# Patient Record
Sex: MALE | Race: WHITE | NOT HISPANIC OR LATINO | URBAN - METROPOLITAN AREA
[De-identification: names, ages, dates, MRNs, and addresses within clinical notes are randomized per-mention and may not be internally consistent; named-entity substitution may affect disease eponyms.]

---

## 2023-01-01 ENCOUNTER — HOSPITAL ENCOUNTER (OUTPATIENT)
Dept: RADIOLOGY | Facility: MEDICAL CENTER | Age: 78
End: 2023-10-29
Attending: INTERNAL MEDICINE

## 2023-01-01 ENCOUNTER — APPOINTMENT (OUTPATIENT)
Dept: RADIOLOGY | Facility: MEDICAL CENTER | Age: 78
DRG: 037 | End: 2023-01-01
Attending: EMERGENCY MEDICINE
Payer: MEDICARE

## 2023-01-01 ENCOUNTER — APPOINTMENT (OUTPATIENT)
Dept: RADIOLOGY | Facility: MEDICAL CENTER | Age: 78
DRG: 037 | End: 2023-01-01
Attending: INTERNAL MEDICINE
Payer: MEDICARE

## 2023-01-01 ENCOUNTER — APPOINTMENT (OUTPATIENT)
Dept: RADIOLOGY | Facility: MEDICAL CENTER | Age: 78
DRG: 037 | End: 2023-01-01
Attending: NURSE PRACTITIONER
Payer: MEDICARE

## 2023-01-01 ENCOUNTER — APPOINTMENT (OUTPATIENT)
Dept: RADIOLOGY | Facility: MEDICAL CENTER | Age: 78
DRG: 037 | End: 2023-01-01
Attending: PSYCHIATRY & NEUROLOGY
Payer: MEDICARE

## 2023-01-01 ENCOUNTER — APPOINTMENT (OUTPATIENT)
Dept: CARDIOLOGY | Facility: MEDICAL CENTER | Age: 78
DRG: 037 | End: 2023-01-01
Attending: INTERNAL MEDICINE
Payer: MEDICARE

## 2023-01-01 ENCOUNTER — HOSPITAL ENCOUNTER (INPATIENT)
Facility: MEDICAL CENTER | Age: 78
LOS: 5 days | DRG: 037 | End: 2023-11-03
Attending: EMERGENCY MEDICINE | Admitting: INTERNAL MEDICINE
Payer: MEDICARE

## 2023-01-01 VITALS
WEIGHT: 194.67 LBS | HEIGHT: 73 IN | RESPIRATION RATE: 24 BRPM | OXYGEN SATURATION: 65 % | DIASTOLIC BLOOD PRESSURE: 78 MMHG | BODY MASS INDEX: 25.8 KG/M2 | HEART RATE: 83 BPM | TEMPERATURE: 97 F | SYSTOLIC BLOOD PRESSURE: 134 MMHG

## 2023-01-01 DIAGNOSIS — I63.512 ACUTE ISCHEMIC LEFT MIDDLE CEREBRAL ARTERY (MCA) STROKE (HCC): ICD-10-CM

## 2023-01-01 DIAGNOSIS — J96.01 ACUTE RESPIRATORY FAILURE WITH HYPOXIA (HCC): ICD-10-CM

## 2023-01-01 DIAGNOSIS — C79.9 METASTATIC ADENOCARCINOMA (HCC): ICD-10-CM

## 2023-01-01 LAB
ABO + RH BLD: NORMAL
ABO GROUP BLD: NORMAL
ALBUMIN SERPL BCP-MCNC: 2.8 G/DL (ref 3.2–4.9)
ALBUMIN SERPL BCP-MCNC: 3.2 G/DL (ref 3.2–4.9)
ALBUMIN SERPL BCP-MCNC: 3.5 G/DL (ref 3.2–4.9)
ALBUMIN SERPL BCP-MCNC: 4 G/DL (ref 3.2–4.9)
ALBUMIN/GLOB SERPL: 1.1 G/DL
ALBUMIN/GLOB SERPL: 1.2 G/DL
ALBUMIN/GLOB SERPL: 1.3 G/DL
ALBUMIN/GLOB SERPL: 1.3 G/DL
ALP SERPL-CCNC: 101 U/L (ref 30–99)
ALP SERPL-CCNC: 103 U/L (ref 30–99)
ALP SERPL-CCNC: 76 U/L (ref 30–99)
ALP SERPL-CCNC: 86 U/L (ref 30–99)
ALT SERPL-CCNC: 21 U/L (ref 2–50)
ALT SERPL-CCNC: 27 U/L (ref 2–50)
ALT SERPL-CCNC: 31 U/L (ref 2–50)
ALT SERPL-CCNC: 43 U/L (ref 2–50)
ANION GAP SERPL CALC-SCNC: 10 MMOL/L (ref 7–16)
ANION GAP SERPL CALC-SCNC: 12 MMOL/L (ref 7–16)
ANION GAP SERPL CALC-SCNC: 12 MMOL/L (ref 7–16)
ANION GAP SERPL CALC-SCNC: 9 MMOL/L (ref 7–16)
APTT PPP: 35.2 SEC (ref 24.7–36)
ARTERIAL PATENCY WRIST A: ABNORMAL
AST SERPL-CCNC: 24 U/L (ref 12–45)
AST SERPL-CCNC: 34 U/L (ref 12–45)
AST SERPL-CCNC: 44 U/L (ref 12–45)
AST SERPL-CCNC: 47 U/L (ref 12–45)
BASE EXCESS BLDA CALC-SCNC: -2 MMOL/L (ref -4–3)
BASE EXCESS BLDA CALC-SCNC: -3 MMOL/L (ref -4–3)
BASE EXCESS BLDA CALC-SCNC: 1 MMOL/L (ref -4–3)
BASOPHILS # BLD AUTO: 0.3 % (ref 0–1.8)
BASOPHILS # BLD AUTO: 0.4 % (ref 0–1.8)
BASOPHILS # BLD AUTO: 0.5 % (ref 0–1.8)
BASOPHILS # BLD AUTO: 0.6 % (ref 0–1.8)
BASOPHILS # BLD: 0.04 K/UL (ref 0–0.12)
BASOPHILS # BLD: 0.04 K/UL (ref 0–0.12)
BASOPHILS # BLD: 0.06 K/UL (ref 0–0.12)
BASOPHILS # BLD: 0.07 K/UL (ref 0–0.12)
BILIRUB SERPL-MCNC: 0.4 MG/DL (ref 0.1–1.5)
BILIRUB SERPL-MCNC: 0.5 MG/DL (ref 0.1–1.5)
BLD GP AB SCN SERPL QL: NORMAL
BODY TEMPERATURE: ABNORMAL DEGREES
BREATHS SETTING VENT: 18
BREATHS SETTING VENT: 22
BREATHS SETTING VENT: 26
BUN SERPL-MCNC: 11 MG/DL (ref 8–22)
BUN SERPL-MCNC: 13 MG/DL (ref 8–22)
BUN SERPL-MCNC: 17 MG/DL (ref 8–22)
BUN SERPL-MCNC: 19 MG/DL (ref 8–22)
CALCIUM ALBUM COR SERPL-MCNC: 10 MG/DL (ref 8.5–10.5)
CALCIUM ALBUM COR SERPL-MCNC: 9 MG/DL (ref 8.5–10.5)
CALCIUM ALBUM COR SERPL-MCNC: 9.3 MG/DL (ref 8.5–10.5)
CALCIUM ALBUM COR SERPL-MCNC: 9.4 MG/DL (ref 8.5–10.5)
CALCIUM SERPL-MCNC: 10 MG/DL (ref 8.5–10.5)
CALCIUM SERPL-MCNC: 8 MG/DL (ref 8.5–10.5)
CALCIUM SERPL-MCNC: 8.8 MG/DL (ref 8.5–10.5)
CALCIUM SERPL-MCNC: 8.9 MG/DL (ref 8.5–10.5)
CHLORIDE SERPL-SCNC: 101 MMOL/L (ref 96–112)
CHLORIDE SERPL-SCNC: 102 MMOL/L (ref 96–112)
CHLORIDE SERPL-SCNC: 102 MMOL/L (ref 96–112)
CHLORIDE SERPL-SCNC: 103 MMOL/L (ref 96–112)
CHOLEST SERPL-MCNC: 105 MG/DL (ref 100–199)
CHOLEST SERPL-MCNC: 129 MG/DL (ref 100–199)
CO2 BLDA-SCNC: 21 MMOL/L (ref 20–33)
CO2 BLDA-SCNC: 21 MMOL/L (ref 20–33)
CO2 BLDA-SCNC: 25 MMOL/L (ref 20–33)
CO2 SERPL-SCNC: 20 MMOL/L (ref 20–33)
CO2 SERPL-SCNC: 23 MMOL/L (ref 20–33)
CREAT SERPL-MCNC: 0.53 MG/DL (ref 0.5–1.4)
CREAT SERPL-MCNC: 0.54 MG/DL (ref 0.5–1.4)
CREAT SERPL-MCNC: 0.74 MG/DL (ref 0.5–1.4)
CREAT SERPL-MCNC: 0.8 MG/DL (ref 0.5–1.4)
CRP SERPL HS-MCNC: 1.86 MG/DL (ref 0–0.75)
DELSYS IDSYS: ABNORMAL
EKG IMPRESSION: NORMAL
END TIDAL CARBON DIOXIDE IECO2: 21 MMHG
END TIDAL CARBON DIOXIDE IECO2: 23 MMHG
END TIDAL CARBON DIOXIDE IECO2: 28 MMHG
EOSINOPHIL # BLD AUTO: 0.01 K/UL (ref 0–0.51)
EOSINOPHIL # BLD AUTO: 0.09 K/UL (ref 0–0.51)
EOSINOPHIL # BLD AUTO: 0.12 K/UL (ref 0–0.51)
EOSINOPHIL # BLD AUTO: 0.16 K/UL (ref 0–0.51)
EOSINOPHIL NFR BLD: 0.1 % (ref 0–6.9)
EOSINOPHIL NFR BLD: 0.9 % (ref 0–6.9)
EOSINOPHIL NFR BLD: 1.1 % (ref 0–6.9)
EOSINOPHIL NFR BLD: 1.4 % (ref 0–6.9)
ERYTHROCYTE [DISTWIDTH] IN BLOOD BY AUTOMATED COUNT: 46.7 FL (ref 35.9–50)
ERYTHROCYTE [DISTWIDTH] IN BLOOD BY AUTOMATED COUNT: 46.8 FL (ref 35.9–50)
ERYTHROCYTE [DISTWIDTH] IN BLOOD BY AUTOMATED COUNT: 47.2 FL (ref 35.9–50)
ERYTHROCYTE [DISTWIDTH] IN BLOOD BY AUTOMATED COUNT: 48.3 FL (ref 35.9–50)
EST. AVERAGE GLUCOSE BLD GHB EST-MCNC: 131 MG/DL
GFR SERPLBLD CREATININE-BSD FMLA CKD-EPI: 102 ML/MIN/1.73 M 2
GFR SERPLBLD CREATININE-BSD FMLA CKD-EPI: 103 ML/MIN/1.73 M 2
GFR SERPLBLD CREATININE-BSD FMLA CKD-EPI: 91 ML/MIN/1.73 M 2
GFR SERPLBLD CREATININE-BSD FMLA CKD-EPI: 93 ML/MIN/1.73 M 2
GLOBULIN SER CALC-MCNC: 2.5 G/DL (ref 1.9–3.5)
GLOBULIN SER CALC-MCNC: 2.7 G/DL (ref 1.9–3.5)
GLOBULIN SER CALC-MCNC: 2.7 G/DL (ref 1.9–3.5)
GLOBULIN SER CALC-MCNC: 3 G/DL (ref 1.9–3.5)
GLUCOSE BLD STRIP.AUTO-MCNC: 109 MG/DL (ref 65–99)
GLUCOSE BLD STRIP.AUTO-MCNC: 123 MG/DL (ref 65–99)
GLUCOSE BLD STRIP.AUTO-MCNC: 130 MG/DL (ref 65–99)
GLUCOSE BLD STRIP.AUTO-MCNC: 130 MG/DL (ref 65–99)
GLUCOSE BLD STRIP.AUTO-MCNC: 131 MG/DL (ref 65–99)
GLUCOSE BLD STRIP.AUTO-MCNC: 135 MG/DL (ref 65–99)
GLUCOSE BLD STRIP.AUTO-MCNC: 148 MG/DL (ref 65–99)
GLUCOSE SERPL-MCNC: 116 MG/DL (ref 65–99)
GLUCOSE SERPL-MCNC: 133 MG/DL (ref 65–99)
GLUCOSE SERPL-MCNC: 143 MG/DL (ref 65–99)
GLUCOSE SERPL-MCNC: 154 MG/DL (ref 65–99)
HBA1C MFR BLD: 6.2 % (ref 4–5.6)
HCO3 BLDA-SCNC: 20.2 MMOL/L (ref 17–25)
HCO3 BLDA-SCNC: 20.2 MMOL/L (ref 17–25)
HCO3 BLDA-SCNC: 24.3 MMOL/L (ref 17–25)
HCT VFR BLD AUTO: 35.9 % (ref 42–52)
HCT VFR BLD AUTO: 36.9 % (ref 42–52)
HCT VFR BLD AUTO: 41.3 % (ref 42–52)
HCT VFR BLD AUTO: 43.7 % (ref 42–52)
HDLC SERPL-MCNC: 41 MG/DL
HDLC SERPL-MCNC: 46 MG/DL
HGB BLD-MCNC: 11.9 G/DL (ref 14–18)
HGB BLD-MCNC: 12.3 G/DL (ref 14–18)
HGB BLD-MCNC: 13.5 G/DL (ref 14–18)
HGB BLD-MCNC: 15 G/DL (ref 14–18)
HOROWITZ INDEX BLDA+IHG-RTO: 1163 MM[HG]
HOROWITZ INDEX BLDA+IHG-RTO: 218 MM[HG]
HOROWITZ INDEX BLDA+IHG-RTO: 293 MM[HG]
IMM GRANULOCYTES # BLD AUTO: 0.05 K/UL (ref 0–0.11)
IMM GRANULOCYTES # BLD AUTO: 0.06 K/UL (ref 0–0.11)
IMM GRANULOCYTES NFR BLD AUTO: 0.4 % (ref 0–0.9)
IMM GRANULOCYTES NFR BLD AUTO: 0.4 % (ref 0–0.9)
IMM GRANULOCYTES NFR BLD AUTO: 0.5 % (ref 0–0.9)
IMM GRANULOCYTES NFR BLD AUTO: 0.5 % (ref 0–0.9)
INR PPP: 1.21 (ref 0.87–1.13)
LDLC SERPL CALC-MCNC: 47 MG/DL
LDLC SERPL CALC-MCNC: 64 MG/DL
LV EJECT FRACT  99904: 60
LV EJECT FRACT MOD 2C 99903: 61.32
LV EJECT FRACT MOD 4C 99902: 52.06
LV EJECT FRACT MOD BP 99901: 58.23
LYMPHOCYTES # BLD AUTO: 0.9 K/UL (ref 1–4.8)
LYMPHOCYTES # BLD AUTO: 0.9 K/UL (ref 1–4.8)
LYMPHOCYTES # BLD AUTO: 0.99 K/UL (ref 1–4.8)
LYMPHOCYTES # BLD AUTO: 1.12 K/UL (ref 1–4.8)
LYMPHOCYTES NFR BLD: 10.9 % (ref 22–41)
LYMPHOCYTES NFR BLD: 7.6 % (ref 22–41)
LYMPHOCYTES NFR BLD: 8.1 % (ref 22–41)
LYMPHOCYTES NFR BLD: 8.8 % (ref 22–41)
MAGNESIUM SERPL-MCNC: 1.9 MG/DL (ref 1.5–2.5)
MAGNESIUM SERPL-MCNC: 2 MG/DL (ref 1.5–2.5)
MAGNESIUM SERPL-MCNC: 2.3 MG/DL (ref 1.5–2.5)
MCH RBC QN AUTO: 30.5 PG (ref 27–33)
MCH RBC QN AUTO: 30.6 PG (ref 27–33)
MCH RBC QN AUTO: 31 PG (ref 27–33)
MCH RBC QN AUTO: 31.6 PG (ref 27–33)
MCHC RBC AUTO-ENTMCNC: 32.7 G/DL (ref 32.3–36.5)
MCHC RBC AUTO-ENTMCNC: 33.1 G/DL (ref 32.3–36.5)
MCHC RBC AUTO-ENTMCNC: 33.3 G/DL (ref 32.3–36.5)
MCHC RBC AUTO-ENTMCNC: 34.3 G/DL (ref 32.3–36.5)
MCV RBC AUTO: 91.8 FL (ref 81.4–97.8)
MCV RBC AUTO: 92 FL (ref 81.4–97.8)
MCV RBC AUTO: 93.4 FL (ref 81.4–97.8)
MCV RBC AUTO: 93.5 FL (ref 81.4–97.8)
MODE IMODE: ABNORMAL
MONOCYTES # BLD AUTO: 0.98 K/UL (ref 0–0.85)
MONOCYTES # BLD AUTO: 1.01 K/UL (ref 0–0.85)
MONOCYTES # BLD AUTO: 1.03 K/UL (ref 0–0.85)
MONOCYTES # BLD AUTO: 1.07 K/UL (ref 0–0.85)
MONOCYTES NFR BLD AUTO: 8.8 % (ref 0–13.4)
MONOCYTES NFR BLD AUTO: 9.1 % (ref 0–13.4)
MONOCYTES NFR BLD AUTO: 9.1 % (ref 0–13.4)
MONOCYTES NFR BLD AUTO: 9.9 % (ref 0–13.4)
NEUTROPHILS # BLD AUTO: 7.92 K/UL (ref 1.82–7.42)
NEUTROPHILS # BLD AUTO: 9.01 K/UL (ref 1.82–7.42)
NEUTROPHILS # BLD AUTO: 9.04 K/UL (ref 1.82–7.42)
NEUTROPHILS # BLD AUTO: 9.7 K/UL (ref 1.82–7.42)
NEUTROPHILS NFR BLD: 77.4 % (ref 44–72)
NEUTROPHILS NFR BLD: 80 % (ref 44–72)
NEUTROPHILS NFR BLD: 80.7 % (ref 44–72)
NEUTROPHILS NFR BLD: 82.5 % (ref 44–72)
NRBC # BLD AUTO: 0 K/UL
NRBC BLD-RTO: 0 /100 WBC (ref 0–0.2)
O2/TOTAL GAS SETTING VFR VENT: 30 %
O2/TOTAL GAS SETTING VFR VENT: 30 %
O2/TOTAL GAS SETTING VFR VENT: 40 %
PCO2 BLDA: 28.9 MMHG (ref 26–37)
PCO2 BLDA: 30.5 MMHG (ref 26–37)
PCO2 BLDA: 32.8 MMHG (ref 26–37)
PCO2 TEMP ADJ BLDA: 29.9 MMHG (ref 26–37)
PCO2 TEMP ADJ BLDA: 31.5 MMHG (ref 26–37)
PCO2 TEMP ADJ BLDA: 33.9 MMHG (ref 26–37)
PEEP END EXPIRATORY PRESSURE IPEEP: 8 CMH20
PH BLDA: 7.43 [PH] (ref 7.4–7.5)
PH BLDA: 7.45 [PH] (ref 7.4–7.5)
PH BLDA: 7.48 [PH] (ref 7.4–7.5)
PH TEMP ADJ BLDA: 7.42 [PH] (ref 7.4–7.5)
PH TEMP ADJ BLDA: 7.44 [PH] (ref 7.4–7.5)
PH TEMP ADJ BLDA: 7.47 [PH] (ref 7.4–7.5)
PHOSPHATE SERPL-MCNC: 1.5 MG/DL (ref 2.5–4.5)
PHOSPHATE SERPL-MCNC: 2.5 MG/DL (ref 2.5–4.5)
PHOSPHATE SERPL-MCNC: 2.6 MG/DL (ref 2.5–4.5)
PHOSPHATE SERPL-MCNC: 3 MG/DL (ref 2.5–4.5)
PLATELET # BLD AUTO: 103 K/UL (ref 164–446)
PLATELET # BLD AUTO: 123 K/UL (ref 164–446)
PLATELET # BLD AUTO: 141 K/UL (ref 164–446)
PLATELET # BLD AUTO: 82 K/UL (ref 164–446)
PLATELETS.RETICULATED NFR BLD AUTO: 4.1 % (ref 0.6–13.1)
PMV BLD AUTO: 10.2 FL (ref 9–12.9)
PMV BLD AUTO: 10.4 FL (ref 9–12.9)
PMV BLD AUTO: 10.9 FL (ref 9–12.9)
PMV BLD AUTO: 9.9 FL (ref 9–12.9)
PO2 BLDA: 349 MMHG (ref 64–87)
PO2 BLDA: 87 MMHG (ref 64–87)
PO2 BLDA: 88 MMHG (ref 64–87)
PO2 TEMP ADJ BLDA: 353 MMHG (ref 64–87)
PO2 TEMP ADJ BLDA: 91 MMHG (ref 64–87)
PO2 TEMP ADJ BLDA: 92 MMHG (ref 64–87)
POTASSIUM SERPL-SCNC: 3.6 MMOL/L (ref 3.6–5.5)
POTASSIUM SERPL-SCNC: 4.1 MMOL/L (ref 3.6–5.5)
POTASSIUM SERPL-SCNC: 4.1 MMOL/L (ref 3.6–5.5)
POTASSIUM SERPL-SCNC: 4.4 MMOL/L (ref 3.6–5.5)
PREALB SERPL-MCNC: 14.7 MG/DL (ref 18–38)
PROCALCITONIN SERPL-MCNC: <0.05 NG/ML
PROT SERPL-MCNC: 5.3 G/DL (ref 6–8.2)
PROT SERPL-MCNC: 5.9 G/DL (ref 6–8.2)
PROT SERPL-MCNC: 6.2 G/DL (ref 6–8.2)
PROT SERPL-MCNC: 7 G/DL (ref 6–8.2)
PROTHROMBIN TIME: 15.4 SEC (ref 12–14.6)
RBC # BLD AUTO: 3.84 M/UL (ref 4.7–6.1)
RBC # BLD AUTO: 4.02 M/UL (ref 4.7–6.1)
RBC # BLD AUTO: 4.42 M/UL (ref 4.7–6.1)
RBC # BLD AUTO: 4.75 M/UL (ref 4.7–6.1)
RH BLD: NORMAL
SAO2 % BLDA: 100 % (ref 93–99)
SAO2 % BLDA: 97 % (ref 93–99)
SAO2 % BLDA: 97 % (ref 93–99)
SCCMEC + MECA PNL NOSE NAA+PROBE: NEGATIVE
SODIUM SERPL-SCNC: 134 MMOL/L (ref 135–145)
SODIUM SERPL-SCNC: 135 MMOL/L (ref 135–145)
SODIUM SERPL-SCNC: 135 MMOL/L (ref 135–145)
SODIUM SERPL-SCNC: 136 MMOL/L (ref 135–145)
SPECIMEN DRAWN FROM PATIENT: ABNORMAL
TIDAL VOLUME IVT: 480 ML
TRIGL SERPL-MCNC: 76 MG/DL (ref 0–149)
TRIGL SERPL-MCNC: 86 MG/DL (ref 0–149)
TRIGL SERPL-MCNC: 97 MG/DL (ref 0–149)
TROPONIN T SERPL-MCNC: 274 NG/L (ref 6–19)
TSH SERPL DL<=0.005 MIU/L-ACNC: 3.83 UIU/ML (ref 0.38–5.33)
WBC # BLD AUTO: 10.2 K/UL (ref 4.8–10.8)
WBC # BLD AUTO: 11.2 K/UL (ref 4.8–10.8)
WBC # BLD AUTO: 11.3 K/UL (ref 4.8–10.8)
WBC # BLD AUTO: 11.8 K/UL (ref 4.8–10.8)

## 2023-01-01 PROCEDURE — 82803 BLOOD GASES ANY COMBINATION: CPT

## 2023-01-01 PROCEDURE — 85055 RETICULATED PLATELET ASSAY: CPT

## 2023-01-01 PROCEDURE — 80053 COMPREHEN METABOLIC PANEL: CPT

## 2023-01-01 PROCEDURE — 36600 WITHDRAWAL OF ARTERIAL BLOOD: CPT

## 2023-01-01 PROCEDURE — 700101 HCHG RX REV CODE 250: Performed by: INTERNAL MEDICINE

## 2023-01-01 PROCEDURE — 71045 X-RAY EXAM CHEST 1 VIEW: CPT

## 2023-01-01 PROCEDURE — 99291 CRITICAL CARE FIRST HOUR: CPT

## 2023-01-01 PROCEDURE — 99291 CRITICAL CARE FIRST HOUR: CPT | Performed by: INTERNAL MEDICINE

## 2023-01-01 PROCEDURE — 700111 HCHG RX REV CODE 636 W/ 250 OVERRIDE (IP): Performed by: NURSE PRACTITIONER

## 2023-01-01 PROCEDURE — 86850 RBC ANTIBODY SCREEN: CPT

## 2023-01-01 PROCEDURE — 99232 SBSQ HOSP IP/OBS MODERATE 35: CPT | Performed by: STUDENT IN AN ORGANIZED HEALTH CARE EDUCATION/TRAINING PROGRAM

## 2023-01-01 PROCEDURE — 0BH18EZ INSERTION OF ENDOTRACHEAL AIRWAY INTO TRACHEA, VIA NATURAL OR ARTIFICIAL OPENING ENDOSCOPIC: ICD-10-PCS | Performed by: EMERGENCY MEDICINE

## 2023-01-01 PROCEDURE — 93010 ELECTROCARDIOGRAM REPORT: CPT | Performed by: INTERNAL MEDICINE

## 2023-01-01 PROCEDURE — 84100 ASSAY OF PHOSPHORUS: CPT

## 2023-01-01 PROCEDURE — 700105 HCHG RX REV CODE 258: Performed by: INTERNAL MEDICINE

## 2023-01-01 PROCEDURE — A9270 NON-COVERED ITEM OR SERVICE: HCPCS | Performed by: NURSE PRACTITIONER

## 2023-01-01 PROCEDURE — B3181ZZ FLUOROSCOPY OF BILATERAL INTERNAL CAROTID ARTERIES USING LOW OSMOLAR CONTRAST: ICD-10-PCS | Performed by: RADIOLOGY

## 2023-01-01 PROCEDURE — 36415 COLL VENOUS BLD VENIPUNCTURE: CPT

## 2023-01-01 PROCEDURE — 770022 HCHG ROOM/CARE - ICU (200)

## 2023-01-01 PROCEDURE — 83735 ASSAY OF MAGNESIUM: CPT

## 2023-01-01 PROCEDURE — 99232 SBSQ HOSP IP/OBS MODERATE 35: CPT | Performed by: PSYCHIATRY & NEUROLOGY

## 2023-01-01 PROCEDURE — 700111 HCHG RX REV CODE 636 W/ 250 OVERRIDE (IP): Mod: JZ | Performed by: INTERNAL MEDICINE

## 2023-01-01 PROCEDURE — 82962 GLUCOSE BLOOD TEST: CPT

## 2023-01-01 PROCEDURE — 700102 HCHG RX REV CODE 250 W/ 637 OVERRIDE(OP): Performed by: STUDENT IN AN ORGANIZED HEALTH CARE EDUCATION/TRAINING PROGRAM

## 2023-01-01 PROCEDURE — 700105 HCHG RX REV CODE 258: Performed by: NURSE PRACTITIONER

## 2023-01-01 PROCEDURE — 97163 PT EVAL HIGH COMPLEX 45 MIN: CPT

## 2023-01-01 PROCEDURE — 86901 BLOOD TYPING SEROLOGIC RH(D): CPT

## 2023-01-01 PROCEDURE — 302136 NUTRITION PUMP: Performed by: NURSE PRACTITIONER

## 2023-01-01 PROCEDURE — A9270 NON-COVERED ITEM OR SERVICE: HCPCS | Performed by: INTERNAL MEDICINE

## 2023-01-01 PROCEDURE — 85730 THROMBOPLASTIN TIME PARTIAL: CPT

## 2023-01-01 PROCEDURE — 96366 THER/PROPH/DIAG IV INF ADDON: CPT

## 2023-01-01 PROCEDURE — 94799 UNLISTED PULMONARY SVC/PX: CPT

## 2023-01-01 PROCEDURE — 94003 VENT MGMT INPAT SUBQ DAY: CPT

## 2023-01-01 PROCEDURE — 80061 LIPID PANEL: CPT

## 2023-01-01 PROCEDURE — 700101 HCHG RX REV CODE 250: Performed by: STUDENT IN AN ORGANIZED HEALTH CARE EDUCATION/TRAINING PROGRAM

## 2023-01-01 PROCEDURE — 700111 HCHG RX REV CODE 636 W/ 250 OVERRIDE (IP): Performed by: INTERNAL MEDICINE

## 2023-01-01 PROCEDURE — 86140 C-REACTIVE PROTEIN: CPT

## 2023-01-01 PROCEDURE — 84145 PROCALCITONIN (PCT): CPT

## 2023-01-01 PROCEDURE — 99292 CRITICAL CARE ADDL 30 MIN: CPT | Performed by: INTERNAL MEDICINE

## 2023-01-01 PROCEDURE — 85025 COMPLETE CBC W/AUTO DIFF WBC: CPT

## 2023-01-01 PROCEDURE — 0042T CT-CEREBRAL PERFUSION ANALYSIS: CPT

## 2023-01-01 PROCEDURE — 770001 HCHG ROOM/CARE - MED/SURG/GYN PRIV*

## 2023-01-01 PROCEDURE — 700102 HCHG RX REV CODE 250 W/ 637 OVERRIDE(OP): Performed by: INTERNAL MEDICINE

## 2023-01-01 PROCEDURE — 84134 ASSAY OF PREALBUMIN: CPT

## 2023-01-01 PROCEDURE — 99291 CRITICAL CARE FIRST HOUR: CPT | Performed by: NURSE PRACTITIONER

## 2023-01-01 PROCEDURE — 87641 MR-STAPH DNA AMP PROBE: CPT

## 2023-01-01 PROCEDURE — 85610 PROTHROMBIN TIME: CPT

## 2023-01-01 PROCEDURE — 84478 ASSAY OF TRIGLYCERIDES: CPT

## 2023-01-01 PROCEDURE — 770004 HCHG ROOM/CARE - ONCOLOGY PRIVATE *

## 2023-01-01 PROCEDURE — 70551 MRI BRAIN STEM W/O DYE: CPT

## 2023-01-01 PROCEDURE — 86900 BLOOD TYPING SEROLOGIC ABO: CPT

## 2023-01-01 PROCEDURE — 304538 HCHG NG TUBE

## 2023-01-01 PROCEDURE — A9270 NON-COVERED ITEM OR SERVICE: HCPCS | Performed by: STUDENT IN AN ORGANIZED HEALTH CARE EDUCATION/TRAINING PROGRAM

## 2023-01-01 PROCEDURE — 93306 TTE W/DOPPLER COMPLETE: CPT

## 2023-01-01 PROCEDURE — 5A1945Z RESPIRATORY VENTILATION, 24-96 CONSECUTIVE HOURS: ICD-10-PCS | Performed by: EMERGENCY MEDICINE

## 2023-01-01 PROCEDURE — 96365 THER/PROPH/DIAG IV INF INIT: CPT

## 2023-01-01 PROCEDURE — 302136 NUTRITION PUMP: Performed by: INTERNAL MEDICINE

## 2023-01-01 PROCEDURE — 700102 HCHG RX REV CODE 250 W/ 637 OVERRIDE(OP): Performed by: NURSE PRACTITIONER

## 2023-01-01 PROCEDURE — 84484 ASSAY OF TROPONIN QUANT: CPT

## 2023-01-01 PROCEDURE — 93306 TTE W/DOPPLER COMPLETE: CPT | Mod: 26 | Performed by: INTERNAL MEDICINE

## 2023-01-01 PROCEDURE — 31500 INSERT EMERGENCY AIRWAY: CPT

## 2023-01-01 PROCEDURE — 97167 OT EVAL HIGH COMPLEX 60 MIN: CPT

## 2023-01-01 PROCEDURE — 84443 ASSAY THYROID STIM HORMONE: CPT

## 2023-01-01 PROCEDURE — 302214 INTUBATION BOX: Performed by: EMERGENCY MEDICINE

## 2023-01-01 PROCEDURE — 83036 HEMOGLOBIN GLYCOSYLATED A1C: CPT

## 2023-01-01 PROCEDURE — 94150 VITAL CAPACITY TEST: CPT

## 2023-01-01 PROCEDURE — C1887 CATHETER, GUIDING: HCPCS

## 2023-01-01 PROCEDURE — 700101 HCHG RX REV CODE 250: Performed by: NURSE PRACTITIONER

## 2023-01-01 PROCEDURE — 700117 HCHG RX CONTRAST REV CODE 255: Performed by: EMERGENCY MEDICINE

## 2023-01-01 PROCEDURE — 84100 ASSAY OF PHOSPHORUS: CPT | Mod: 91

## 2023-01-01 PROCEDURE — 82962 GLUCOSE BLOOD TEST: CPT | Mod: 91

## 2023-01-01 PROCEDURE — 03CY3ZZ EXTIRPATION OF MATTER FROM UPPER ARTERY, PERCUTANEOUS APPROACH: ICD-10-PCS | Performed by: RADIOLOGY

## 2023-01-01 PROCEDURE — 700117 HCHG RX CONTRAST REV CODE 255: Performed by: INTERNAL MEDICINE

## 2023-01-01 PROCEDURE — 700117 HCHG RX CONTRAST REV CODE 255: Performed by: RADIOLOGY

## 2023-01-01 PROCEDURE — 94002 VENT MGMT INPAT INIT DAY: CPT

## 2023-01-01 PROCEDURE — 99291 CRITICAL CARE FIRST HOUR: CPT | Performed by: PSYCHIATRY & NEUROLOGY

## 2023-01-01 PROCEDURE — 93005 ELECTROCARDIOGRAM TRACING: CPT | Performed by: EMERGENCY MEDICINE

## 2023-01-01 PROCEDURE — 99233 SBSQ HOSP IP/OBS HIGH 50: CPT | Performed by: PSYCHIATRY & NEUROLOGY

## 2023-01-01 PROCEDURE — 700111 HCHG RX REV CODE 636 W/ 250 OVERRIDE (IP): Performed by: EMERGENCY MEDICINE

## 2023-01-01 RX ORDER — ETOMIDATE 2 MG/ML
20 INJECTION INTRAVENOUS ONCE
Status: COMPLETED | OUTPATIENT
Start: 2023-01-01 | End: 2023-01-01

## 2023-01-01 RX ORDER — PANTOPRAZOLE SODIUM 40 MG/1
40 TABLET, DELAYED RELEASE ORAL EVERY MORNING
Status: ON HOLD | COMMUNITY
End: 2023-01-01

## 2023-01-01 RX ORDER — NOREPINEPHRINE BITARTRATE 0.03 MG/ML
0-1 INJECTION, SOLUTION INTRAVENOUS CONTINUOUS
Status: DISCONTINUED | OUTPATIENT
Start: 2023-01-01 | End: 2023-01-01

## 2023-01-01 RX ORDER — POLYETHYLENE GLYCOL 3350 17 G/17G
1 POWDER, FOR SOLUTION ORAL
Status: DISCONTINUED | OUTPATIENT
Start: 2023-01-01 | End: 2023-01-01

## 2023-01-01 RX ORDER — LACTULOSE 20 G/30ML
10 SOLUTION ORAL
Status: DISCONTINUED | OUTPATIENT
Start: 2023-01-01 | End: 2023-01-01 | Stop reason: HOSPADM

## 2023-01-01 RX ORDER — SUCCINYLCHOLINE CHLORIDE 20 MG/ML
150 INJECTION INTRAMUSCULAR; INTRAVENOUS ONCE
Status: COMPLETED | OUTPATIENT
Start: 2023-01-01 | End: 2023-01-01

## 2023-01-01 RX ORDER — ENOXAPARIN SODIUM 150 MG/ML
1 INJECTION SUBCUTANEOUS EVERY 12 HOURS
Status: ON HOLD | COMMUNITY
End: 2023-01-01

## 2023-01-01 RX ORDER — LACTULOSE 20 G/30ML
10 SOLUTION ORAL
Qty: 450 ML | Refills: 0
Start: 2023-01-01 | End: 2023-11-04

## 2023-01-01 RX ORDER — GUAIFENESIN 600 MG/1
600 TABLET, EXTENDED RELEASE ORAL EVERY 12 HOURS
Status: ON HOLD | COMMUNITY
End: 2023-01-01

## 2023-01-01 RX ORDER — ACETAMINOPHEN 325 MG/1
325-650 TABLET ORAL EVERY 6 HOURS PRN
Status: ON HOLD | COMMUNITY
End: 2023-01-01

## 2023-01-01 RX ORDER — ATROPINE SULFATE 10 MG/ML
2 SOLUTION/ DROPS OPHTHALMIC EVERY 4 HOURS PRN
Status: DISCONTINUED | OUTPATIENT
Start: 2023-01-01 | End: 2023-01-01

## 2023-01-01 RX ORDER — ACETAMINOPHEN 650 MG/1
650 SUPPOSITORY RECTAL EVERY 4 HOURS PRN
Qty: 10 SUPPOSITORY | Refills: 0
Start: 2023-01-01 | End: 2023-11-04

## 2023-01-01 RX ORDER — LABETALOL HYDROCHLORIDE 5 MG/ML
10 INJECTION, SOLUTION INTRAVENOUS
Status: DISCONTINUED | OUTPATIENT
Start: 2023-01-01 | End: 2023-01-01

## 2023-01-01 RX ORDER — DOCUSATE SODIUM 100 MG/1
100 CAPSULE, LIQUID FILLED ORAL EVERY 12 HOURS
Status: DISCONTINUED | OUTPATIENT
Start: 2023-01-01 | End: 2023-01-01

## 2023-01-01 RX ORDER — MORPHINE SULFATE 100 MG/5ML
20 SOLUTION ORAL
Qty: 30 ML | Refills: 0
Start: 2023-01-01 | End: 2023-11-04

## 2023-01-01 RX ORDER — CARBOXYMETHYLCELLULOSE SODIUM 5 MG/ML
1 SOLUTION/ DROPS OPHTHALMIC PRN
Qty: 10 ML | Refills: 0
Start: 2023-01-01 | End: 2023-11-04

## 2023-01-01 RX ORDER — LORAZEPAM 2 MG/ML
1 CONCENTRATE ORAL
Qty: 30 ML | Refills: 0
Start: 2023-01-01 | End: 2023-11-04

## 2023-01-01 RX ORDER — CARBOXYMETHYLCELLULOSE SODIUM 5 MG/ML
1 SOLUTION/ DROPS OPHTHALMIC PRN
Status: DISCONTINUED | OUTPATIENT
Start: 2023-01-01 | End: 2023-01-01 | Stop reason: HOSPADM

## 2023-01-01 RX ORDER — MORPHINE SULFATE 100 MG/5ML
10 SOLUTION ORAL
Qty: 30 ML | Refills: 0
Start: 2023-01-01 | End: 2023-11-04

## 2023-01-01 RX ORDER — MAGNESIUM SULFATE HEPTAHYDRATE 40 MG/ML
2 INJECTION, SOLUTION INTRAVENOUS ONCE
Status: COMPLETED | OUTPATIENT
Start: 2023-01-01 | End: 2023-01-01

## 2023-01-01 RX ORDER — MIDAZOLAM HYDROCHLORIDE 1 MG/ML
2 INJECTION INTRAMUSCULAR; INTRAVENOUS ONCE
Status: DISCONTINUED | OUTPATIENT
Start: 2023-01-01 | End: 2023-01-01

## 2023-01-01 RX ORDER — ACETAMINOPHEN 325 MG/1
650 TABLET ORAL EVERY 4 HOURS PRN
Status: DISCONTINUED | OUTPATIENT
Start: 2023-01-01 | End: 2023-01-01 | Stop reason: HOSPADM

## 2023-01-01 RX ORDER — LORAZEPAM 2 MG/ML
1 CONCENTRATE ORAL
Status: DISCONTINUED | OUTPATIENT
Start: 2023-01-01 | End: 2023-01-01 | Stop reason: HOSPADM

## 2023-01-01 RX ORDER — LORAZEPAM 2 MG/ML
2-4 INJECTION INTRAMUSCULAR ONCE
Status: COMPLETED | OUTPATIENT
Start: 2023-01-01 | End: 2023-01-01

## 2023-01-01 RX ORDER — IPRATROPIUM BROMIDE AND ALBUTEROL SULFATE 2.5; .5 MG/3ML; MG/3ML
3 SOLUTION RESPIRATORY (INHALATION)
Status: DISCONTINUED | OUTPATIENT
Start: 2023-01-01 | End: 2023-01-01

## 2023-01-01 RX ORDER — AZITHROMYCIN 500 MG/5ML
500 INJECTION, POWDER, LYOPHILIZED, FOR SOLUTION INTRAVENOUS EVERY 24 HOURS
Status: DISCONTINUED | OUTPATIENT
Start: 2023-01-01 | End: 2023-01-01

## 2023-01-01 RX ORDER — ALLOPURINOL 300 MG/1
300 TABLET ORAL DAILY
Status: ON HOLD | COMMUNITY
End: 2023-01-01

## 2023-01-01 RX ORDER — LORAZEPAM 2 MG/ML
1 INJECTION INTRAMUSCULAR
Status: DISCONTINUED | OUTPATIENT
Start: 2023-01-01 | End: 2023-01-01 | Stop reason: HOSPADM

## 2023-01-01 RX ORDER — ATROPINE SULFATE 10 MG/ML
SOLUTION/ DROPS OPHTHALMIC
Qty: 30 ML | Refills: 0
Start: 2023-01-01 | End: 2023-11-04

## 2023-01-01 RX ORDER — BISACODYL 10 MG
10 SUPPOSITORY, RECTAL RECTAL
Qty: 10 SUPPOSITORY | Refills: 0
Start: 2023-01-01 | End: 2023-11-04

## 2023-01-01 RX ORDER — LORAZEPAM 2 MG/ML
2-4 INJECTION INTRAMUSCULAR
Status: DISCONTINUED | OUTPATIENT
Start: 2023-01-01 | End: 2023-01-01

## 2023-01-01 RX ORDER — SCOLOPAMINE TRANSDERMAL SYSTEM 1 MG/1
1 PATCH, EXTENDED RELEASE TRANSDERMAL
Status: DISCONTINUED | OUTPATIENT
Start: 2023-01-01 | End: 2023-01-01

## 2023-01-01 RX ORDER — SODIUM CHLORIDE, SODIUM GLUCONATE, SODIUM ACETATE, POTASSIUM CHLORIDE AND MAGNESIUM CHLORIDE 526; 502; 368; 37; 30 MG/100ML; MG/100ML; MG/100ML; MG/100ML; MG/100ML
INJECTION, SOLUTION INTRAVENOUS CONTINUOUS
Status: DISCONTINUED | OUTPATIENT
Start: 2023-01-01 | End: 2023-01-01

## 2023-01-01 RX ORDER — FAMOTIDINE 20 MG/1
20 TABLET, FILM COATED ORAL EVERY 12 HOURS
Status: DISCONTINUED | OUTPATIENT
Start: 2023-01-01 | End: 2023-01-01

## 2023-01-01 RX ORDER — ONDANSETRON 8 MG/1
8 TABLET, ORALLY DISINTEGRATING ORAL EVERY 8 HOURS PRN
Qty: 15 TABLET | Refills: 0
Start: 2023-01-01 | End: 2023-11-04

## 2023-01-01 RX ORDER — BISACODYL 10 MG
10 SUPPOSITORY, RECTAL RECTAL
Status: DISCONTINUED | OUTPATIENT
Start: 2023-01-01 | End: 2023-01-01

## 2023-01-01 RX ORDER — ONDANSETRON 2 MG/ML
8 INJECTION INTRAMUSCULAR; INTRAVENOUS EVERY 8 HOURS PRN
Status: DISCONTINUED | OUTPATIENT
Start: 2023-01-01 | End: 2023-01-01 | Stop reason: HOSPADM

## 2023-01-01 RX ORDER — SODIUM CHLORIDE 9 MG/ML
1000 INJECTION, SOLUTION INTRAVENOUS ONCE
Status: COMPLETED | OUTPATIENT
Start: 2023-01-01 | End: 2023-01-01

## 2023-01-01 RX ORDER — LIDOCAINE HYDROCHLORIDE 20 MG/ML
5 SOLUTION OROPHARYNGEAL EVERY 4 HOURS PRN
Status: DISCONTINUED | OUTPATIENT
Start: 2023-01-01 | End: 2023-01-01 | Stop reason: HOSPADM

## 2023-01-01 RX ORDER — ACETAMINOPHEN 650 MG/1
650 SUPPOSITORY RECTAL EVERY 4 HOURS PRN
Status: DISCONTINUED | OUTPATIENT
Start: 2023-01-01 | End: 2023-01-01 | Stop reason: HOSPADM

## 2023-01-01 RX ORDER — GLYCOPYRROLATE 1 MG/1
1 TABLET ORAL 3 TIMES DAILY PRN
Status: DISCONTINUED | OUTPATIENT
Start: 2023-01-01 | End: 2023-01-01

## 2023-01-01 RX ORDER — CEFDINIR 300 MG/1
300 CAPSULE ORAL 2 TIMES DAILY
Status: ON HOLD | COMMUNITY
End: 2023-01-01

## 2023-01-01 RX ORDER — AMOXICILLIN 250 MG
2 CAPSULE ORAL 2 TIMES DAILY
Status: DISCONTINUED | OUTPATIENT
Start: 2023-01-01 | End: 2023-01-01

## 2023-01-01 RX ORDER — DEXMEDETOMIDINE HYDROCHLORIDE 4 UG/ML
0-.7 INJECTION, SOLUTION INTRAVENOUS CONTINUOUS
Status: DISCONTINUED | OUTPATIENT
Start: 2023-01-01 | End: 2023-01-01

## 2023-01-01 RX ORDER — ONDANSETRON 8 MG/1
8 TABLET, ORALLY DISINTEGRATING ORAL EVERY 8 HOURS PRN
Status: DISCONTINUED | OUTPATIENT
Start: 2023-01-01 | End: 2023-01-01 | Stop reason: HOSPADM

## 2023-01-01 RX ORDER — ATORVASTATIN CALCIUM 40 MG/1
40 TABLET, FILM COATED ORAL NIGHTLY
Status: ON HOLD | COMMUNITY
End: 2023-01-01

## 2023-01-01 RX ORDER — BISACODYL 10 MG
10 SUPPOSITORY, RECTAL RECTAL
Status: DISCONTINUED | OUTPATIENT
Start: 2023-01-01 | End: 2023-01-01 | Stop reason: HOSPADM

## 2023-01-01 RX ORDER — AZITHROMYCIN 250 MG/1
250 TABLET, FILM COATED ORAL DAILY
Status: ON HOLD | COMMUNITY
End: 2023-01-01

## 2023-01-01 RX ORDER — MORPHINE SULFATE 100 MG/5ML
20 SOLUTION ORAL
Status: DISCONTINUED | OUTPATIENT
Start: 2023-01-01 | End: 2023-01-01 | Stop reason: HOSPADM

## 2023-01-01 RX ORDER — IRBESARTAN AND HYDROCHLOROTHIAZIDE 150; 12.5 MG/1; MG/1
1 TABLET, FILM COATED ORAL DAILY
Status: ON HOLD | COMMUNITY
End: 2023-01-01

## 2023-01-01 RX ORDER — ACETAMINOPHEN 325 MG/1
650 TABLET ORAL EVERY 4 HOURS PRN
Qty: 30 TABLET | Refills: 0
Start: 2023-01-01 | End: 2023-11-04

## 2023-01-01 RX ORDER — ATROPINE SULFATE 10 MG/ML
2 SOLUTION/ DROPS OPHTHALMIC
Status: DISCONTINUED | OUTPATIENT
Start: 2023-01-01 | End: 2023-01-01 | Stop reason: HOSPADM

## 2023-01-01 RX ORDER — HYDRALAZINE HYDROCHLORIDE 20 MG/ML
10 INJECTION INTRAMUSCULAR; INTRAVENOUS
Status: DISCONTINUED | OUTPATIENT
Start: 2023-01-01 | End: 2023-01-01

## 2023-01-01 RX ORDER — ATORVASTATIN CALCIUM 40 MG/1
40 TABLET, FILM COATED ORAL EVERY EVENING
Status: DISCONTINUED | OUTPATIENT
Start: 2023-01-01 | End: 2023-01-01

## 2023-01-01 RX ORDER — MORPHINE SULFATE 100 MG/5ML
10 SOLUTION ORAL
Status: DISCONTINUED | OUTPATIENT
Start: 2023-01-01 | End: 2023-01-01 | Stop reason: HOSPADM

## 2023-01-01 RX ORDER — GLYCOPYRROLATE 0.2 MG/ML
0.2 INJECTION INTRAMUSCULAR; INTRAVENOUS 3 TIMES DAILY PRN
Status: DISCONTINUED | OUTPATIENT
Start: 2023-01-01 | End: 2023-01-01

## 2023-01-01 RX ADMIN — SODIUM CHLORIDE, SODIUM GLUCONATE, SODIUM ACETATE, POTASSIUM CHLORIDE AND MAGNESIUM CHLORIDE: 526; 502; 368; 37; 30 INJECTION, SOLUTION INTRAVENOUS at 13:45

## 2023-01-01 RX ADMIN — DOCUSATE SODIUM 50 MG AND SENNOSIDES 8.6 MG 2 TABLET: 8.6; 5 TABLET, FILM COATED ORAL at 18:21

## 2023-01-01 RX ADMIN — DOCUSATE SODIUM 50 MG AND SENNOSIDES 8.6 MG 2 TABLET: 8.6; 5 TABLET, FILM COATED ORAL at 05:04

## 2023-01-01 RX ADMIN — MORPHINE SULFATE 20 MG: 100 SOLUTION ORAL at 15:07

## 2023-01-01 RX ADMIN — PROPOFOL 20 MCG/KG/MIN: 10 INJECTION, EMULSION INTRAVENOUS at 16:08

## 2023-01-01 RX ADMIN — MORPHINE SULFATE 10 MG: 10 INJECTION INTRAVENOUS at 17:37

## 2023-01-01 RX ADMIN — AZITHROMYCIN 500 MG: 500 INJECTION, POWDER, LYOPHILIZED, FOR SOLUTION INTRAVENOUS at 18:20

## 2023-01-01 RX ADMIN — POTASSIUM PHOSPHATE, MONOBASIC POTASSIUM PHOSPHATE, DIBASIC 30 MMOL: 224; 236 INJECTION, SOLUTION, CONCENTRATE INTRAVENOUS at 08:29

## 2023-01-01 RX ADMIN — NOREPINEPHRINE BITARTRATE 0.05 MCG/KG/MIN: 1 INJECTION, SOLUTION, CONCENTRATE INTRAVENOUS at 19:52

## 2023-01-01 RX ADMIN — IOHEXOL 65 ML: 300 INJECTION, SOLUTION INTRAVENOUS at 18:30

## 2023-01-01 RX ADMIN — MORPHINE SULFATE 20 MG: 100 SOLUTION ORAL at 06:22

## 2023-01-01 RX ADMIN — LORAZEPAM 1 MG: 2 LIQUID ORAL at 22:14

## 2023-01-01 RX ADMIN — FAMOTIDINE 20 MG: 20 TABLET, FILM COATED ORAL at 05:14

## 2023-01-01 RX ADMIN — FAMOTIDINE 20 MG: 20 TABLET, FILM COATED ORAL at 18:22

## 2023-01-01 RX ADMIN — SUCCINYLCHOLINE CHLORIDE 150 MG: 20 INJECTION, SOLUTION INTRAMUSCULAR; INTRAVENOUS at 15:47

## 2023-01-01 RX ADMIN — CEFTRIAXONE SODIUM 1000 MG: 10 INJECTION, POWDER, FOR SOLUTION INTRAVENOUS at 05:26

## 2023-01-01 RX ADMIN — ATORVASTATIN CALCIUM 40 MG: 40 TABLET, FILM COATED ORAL at 18:21

## 2023-01-01 RX ADMIN — MORPHINE SULFATE 20 MG: 100 SOLUTION ORAL at 13:24

## 2023-01-01 RX ADMIN — MORPHINE SULFATE 20 MG: 100 SOLUTION ORAL at 16:25

## 2023-01-01 RX ADMIN — SODIUM CHLORIDE, SODIUM GLUCONATE, SODIUM ACETATE, POTASSIUM CHLORIDE AND MAGNESIUM CHLORIDE: 526; 502; 368; 37; 30 INJECTION, SOLUTION INTRAVENOUS at 14:29

## 2023-01-01 RX ADMIN — FAMOTIDINE 20 MG: 20 TABLET, FILM COATED ORAL at 18:21

## 2023-01-01 RX ADMIN — DOCUSATE SODIUM 50 MG AND SENNOSIDES 8.6 MG 2 TABLET: 8.6; 5 TABLET, FILM COATED ORAL at 05:30

## 2023-01-01 RX ADMIN — IOHEXOL 40 ML: 350 INJECTION, SOLUTION INTRAVENOUS at 15:45

## 2023-01-01 RX ADMIN — ATROPINE SULFATE 2 DROP: 10 SOLUTION/ DROPS OPHTHALMIC at 16:25

## 2023-01-01 RX ADMIN — MORPHINE SULFATE 20 MG: 100 SOLUTION ORAL at 10:38

## 2023-01-01 RX ADMIN — NOREPINEPHRINE BITARTRATE 0.1 MCG/KG/MIN: 1 INJECTION, SOLUTION, CONCENTRATE INTRAVENOUS at 13:49

## 2023-01-01 RX ADMIN — SCOPOLAMINE 1 PATCH: 1.5 PATCH, EXTENDED RELEASE TRANSDERMAL at 18:00

## 2023-01-01 RX ADMIN — SODIUM CHLORIDE 1000 ML: 9 INJECTION, SOLUTION INTRAVENOUS at 03:25

## 2023-01-01 RX ADMIN — SODIUM CHLORIDE, SODIUM GLUCONATE, SODIUM ACETATE, POTASSIUM CHLORIDE AND MAGNESIUM CHLORIDE: 526; 502; 368; 37; 30 INJECTION, SOLUTION INTRAVENOUS at 02:26

## 2023-01-01 RX ADMIN — CEFTRIAXONE SODIUM 1000 MG: 10 INJECTION, POWDER, FOR SOLUTION INTRAVENOUS at 18:15

## 2023-01-01 RX ADMIN — MORPHINE SULFATE 20 MG: 100 SOLUTION ORAL at 22:06

## 2023-01-01 RX ADMIN — POTASSIUM PHOSPHATE, MONOBASIC POTASSIUM PHOSPHATE, DIBASIC 15 MMOL: 224; 236 INJECTION, SOLUTION, CONCENTRATE INTRAVENOUS at 15:17

## 2023-01-01 RX ADMIN — MORPHINE SULFATE 20 MG: 100 SOLUTION ORAL at 07:41

## 2023-01-01 RX ADMIN — FAMOTIDINE 20 MG: 20 TABLET, FILM COATED ORAL at 05:04

## 2023-01-01 RX ADMIN — DOCUSATE SODIUM 50 MG AND SENNOSIDES 8.6 MG 2 TABLET: 8.6; 5 TABLET, FILM COATED ORAL at 20:55

## 2023-01-01 RX ADMIN — FAMOTIDINE 20 MG: 20 TABLET, FILM COATED ORAL at 20:55

## 2023-01-01 RX ADMIN — MORPHINE SULFATE 20 MG: 100 SOLUTION ORAL at 23:44

## 2023-01-01 RX ADMIN — MORPHINE SULFATE 20 MG: 100 SOLUTION ORAL at 02:42

## 2023-01-01 RX ADMIN — MIDAZOLAM 2 MG: 1 INJECTION, SOLUTION INTRAMUSCULAR; INTRAVENOUS at 17:37

## 2023-01-01 RX ADMIN — MORPHINE SULFATE 20 MG: 100 SOLUTION ORAL at 19:41

## 2023-01-01 RX ADMIN — DOCUSATE SODIUM 50 MG AND SENNOSIDES 8.6 MG 2 TABLET: 8.6; 5 TABLET, FILM COATED ORAL at 18:22

## 2023-01-01 RX ADMIN — MORPHINE SULFATE 10 MG: 100 SOLUTION ORAL at 09:31

## 2023-01-01 RX ADMIN — ETOMIDATE 20 MG: 2 INJECTION, SOLUTION INTRAVENOUS at 15:46

## 2023-01-01 RX ADMIN — GLYCOPYRROLATE 0.2 MG: 0.2 INJECTION INTRAMUSCULAR; INTRAVENOUS at 18:23

## 2023-01-01 RX ADMIN — NOREPINEPHRINE BITARTRATE 0.1 MCG/KG/MIN: 1 INJECTION, SOLUTION, CONCENTRATE INTRAVENOUS at 06:15

## 2023-01-01 RX ADMIN — LORAZEPAM 4 MG: 2 INJECTION INTRAMUSCULAR; INTRAVENOUS at 10:45

## 2023-01-01 RX ADMIN — MORPHINE SULFATE 20 MG: 100 SOLUTION ORAL at 11:35

## 2023-01-01 RX ADMIN — HUMAN ALBUMIN MICROSPHERES AND PERFLUTREN 3 ML: 10; .22 INJECTION, SOLUTION INTRAVENOUS at 10:15

## 2023-01-01 RX ADMIN — NOREPINEPHRINE BITARTRATE 0.06 MCG/KG/MIN: 1 INJECTION, SOLUTION, CONCENTRATE INTRAVENOUS at 16:11

## 2023-01-01 RX ADMIN — ATROPINE SULFATE 2 DROP: 10 SOLUTION/ DROPS OPHTHALMIC at 22:19

## 2023-01-01 RX ADMIN — SODIUM CHLORIDE, SODIUM GLUCONATE, SODIUM ACETATE, POTASSIUM CHLORIDE AND MAGNESIUM CHLORIDE: 526; 502; 368; 37; 30 INJECTION, SOLUTION INTRAVENOUS at 18:28

## 2023-01-01 RX ADMIN — FAMOTIDINE 20 MG: 20 TABLET, FILM COATED ORAL at 05:31

## 2023-01-01 RX ADMIN — NOREPINEPHRINE BITARTRATE 0.12 MCG/KG/MIN: 1 INJECTION, SOLUTION, CONCENTRATE INTRAVENOUS at 23:25

## 2023-01-01 RX ADMIN — DOCUSATE SODIUM 50 MG AND SENNOSIDES 8.6 MG 2 TABLET: 8.6; 5 TABLET, FILM COATED ORAL at 05:14

## 2023-01-01 RX ADMIN — MAGNESIUM SULFATE HEPTAHYDRATE 2 G: 2 INJECTION, SOLUTION INTRAVENOUS at 10:01

## 2023-01-01 RX ADMIN — ATORVASTATIN CALCIUM 40 MG: 40 TABLET, FILM COATED ORAL at 18:23

## 2023-01-01 RX ADMIN — LORAZEPAM 2 MG: 2 INJECTION INTRAMUSCULAR; INTRAVENOUS at 03:32

## 2023-01-01 RX ADMIN — ATORVASTATIN CALCIUM 40 MG: 40 TABLET, FILM COATED ORAL at 20:55

## 2023-01-01 RX ADMIN — AZITHROMYCIN 500 MG: 500 INJECTION, POWDER, LYOPHILIZED, FOR SOLUTION INTRAVENOUS at 05:29

## 2023-01-01 RX ADMIN — ATROPINE SULFATE 2 DROP: 10 SOLUTION/ DROPS OPHTHALMIC at 18:23

## 2023-01-01 ASSESSMENT — PAIN DESCRIPTION - PAIN TYPE
TYPE: ACUTE PAIN

## 2023-01-01 ASSESSMENT — FIBROSIS 4 INDEX
FIB4 SCORE: 4.1
FIB4 SCORE: 3.92
FIB4 SCORE: 4.1

## 2023-01-01 ASSESSMENT — COGNITIVE AND FUNCTIONAL STATUS - GENERAL
DRESSING REGULAR UPPER BODY CLOTHING: TOTAL
DAILY ACTIVITIY SCORE: 6
TOILETING: TOTAL
PERSONAL GROOMING: TOTAL
PERSONAL GROOMING: TOTAL
SUGGESTED CMS G CODE MODIFIER DAILY ACTIVITY: CN
TURNING FROM BACK TO SIDE WHILE IN FLAT BAD: UNABLE
EATING MEALS: TOTAL
MOBILITY SCORE: 6
MOVING FROM LYING ON BACK TO SITTING ON SIDE OF FLAT BED: UNABLE
TURNING FROM BACK TO SIDE WHILE IN FLAT BAD: UNABLE
DRESSING REGULAR LOWER BODY CLOTHING: TOTAL
HELP NEEDED FOR BATHING: TOTAL
DAILY ACTIVITIY SCORE: 6
SUGGESTED CMS G CODE MODIFIER MOBILITY: CN
TOILETING: TOTAL
STANDING UP FROM CHAIR USING ARMS: TOTAL
WALKING IN HOSPITAL ROOM: TOTAL
CLIMB 3 TO 5 STEPS WITH RAILING: TOTAL
WALKING IN HOSPITAL ROOM: TOTAL
EATING MEALS: TOTAL
MOVING TO AND FROM BED TO CHAIR: UNABLE
MOVING TO AND FROM BED TO CHAIR: UNABLE
DRESSING REGULAR LOWER BODY CLOTHING: TOTAL
SUGGESTED CMS G CODE MODIFIER DAILY ACTIVITY: CN
HELP NEEDED FOR BATHING: TOTAL
STANDING UP FROM CHAIR USING ARMS: TOTAL
DRESSING REGULAR UPPER BODY CLOTHING: TOTAL
MOVING FROM LYING ON BACK TO SITTING ON SIDE OF FLAT BED: UNABLE

## 2023-01-01 ASSESSMENT — PULMONARY FUNCTION TESTS: FVC: 0

## 2023-01-01 ASSESSMENT — ACTIVITIES OF DAILY LIVING (ADL): TOILETING: INDEPENDENT

## 2023-10-29 PROBLEM — C79.9 METASTATIC ADENOCARCINOMA (HCC): Status: ACTIVE | Noted: 2023-01-01

## 2023-10-29 PROBLEM — J18.9 PNEUMONIA: Status: ACTIVE | Noted: 2023-01-01

## 2023-10-29 PROBLEM — J96.01 ACUTE RESPIRATORY FAILURE WITH HYPOXIA (HCC): Status: ACTIVE | Noted: 2023-01-01

## 2023-10-29 PROBLEM — I82.90 VTE (VENOUS THROMBOEMBOLISM): Status: ACTIVE | Noted: 2023-01-01

## 2023-10-29 PROBLEM — I63.9 ACUTE ISCHEMIC STROKE (HCC): Status: ACTIVE | Noted: 2023-01-01

## 2023-10-29 NOTE — CONSULTS
Neurology STROKE H&P  Neurohospitalist Service, [facility]    Referring Physician: Omari Crawford M.D.    STROKE:   Chief Complaint   Patient presents with    Possible Stroke       To obtain the most accurate data regarding the time called, and time patient seen, refer to the stroke run-sheet and chart.  For time of CT, refer to the radiology report. See A&P below for TPA Decision and door to needle time if and when applicable.    HPI: Mateusz Luciano is a 78 y.o. male with history of metastatic adenocarcinoma of the lung who is transferred from [facility] after he was admitted on 10/26/2023 due to PE and multiple embolic infarct.  Per my discussion with internal medicine attending at [facility], patient was initially on Xarelto and subsequently switched to Lovenox and then Eliquis, however continue to have embolic events.  This morning at around 9 AM he was found to be unresponsive with last known normal at around 2 AM.  He underwent CT angiogram of the head and neck which revealed left M1 occlusion for which he was transferred to [facility] for higher level of care and possible thrombectomy.  He is currently unresponsive with occasional spontaneous minimal right upper extremity movement.    Review of systems: In addition to what is detailed in the HPI above, all other systems reviewed and are negative.    Past Medical History:    has no past medical history on file.    FHx:  family history is not on file.    SHx:       Allergies:  Allergies   Allergen Reactions    Penicillins        Medications:    Current Facility-Administered Medications:     propofol (DIPRIVAN) injection, 0-80 mcg/kg/min (Ideal), Intravenous, Continuous **AND** Triglycerides Starting now and then Every 3 Days, , , Every 3 Days (0300), Glenroy Moralez M.D.  No current outpatient medications on file.    Physical Examination:    Vitals:    10/29/23 1526 10/29/23 1536   BP: (!) 140/80    Pulse: 69    Resp: 20   "  Temp: 36.1 °C (97 °F)    TempSrc: Temporal    SpO2: 98%    Weight:  104 kg (230 lb)   Height:  1.854 m (6' 1\")       General:   Patient is obtunded and nonverbal.  Neck: Full range of motion  Eyes: Midline, Pupils reactive to light.  CV: RRR  Lungs: No respiratory distress  Extremities: No cyanosis, warm, no significant edema.    NEUROLOGICAL EXAM:   Mental status: Obtunded and nonverbal.  Not able to follow commands.  Speech and language: Nonverbal and not following commands.  Cranial nerve exam: Pupils are equal, round and reactive to light bilaterally. Visual fields cannot be tested. Extraocular muscles testing are limited as patient is unresponsive.  He withdraws to physical stimulation and has some spontaneous movement of right upper and lower extremity.  Sensation: He reacts to physical stimulation.  Coordination: Cannot be tested.  Gait was deferred.    NIH Stroke Scale:    1a. Level of Consciousness (Alert, drowsy, etc): 2= Stuporous    1b. LOC Questions (Month, age): 2= Incorrect    1c. LOC Commands (Open/close eyes make fist/let go): 2= Incorrect    2.   Best Gaze (Eyes open - patient follows examiner's finger on face): 0= Normal    3.   Visual Fields (introduce visual stimulus/threat to patient's field quadrants): 2= Complete Hemianopia  4.   Facial Paresis (Show teeth, raise eyebrows and squeeze eyes shut): 0= Normal     5a. Motor Arm - Left (Elevate arm to 90 degrees if patient is sitting, 45 degrees if  supine): 4= No movement    5b. Motor Arm - Right (Elevate arm to 90 degrees if patient is sitting, 45 degrees if supine): 3= No effort against gravity    6a. Motor Leg - Left (Elevate leg 30 degrees with patient supine): 4= No movement    6b. Motor Leg - Right  (Elevate leg 30 degrees with patient supine): 3= No effort against gravity    7.   Limb Ataxia (Finger-nose, heel down shin): 0= No ataxia    8.   Sensory (Pin prick to face, arm, trunk and leg - compare side to side): 1= Partial loss    9.  " "Best Language (Name item, describe a picture and read sentences): 3= Mute    10. Dysarthria (Evaluate speech clarity by patient repeating listed words): 2= Near to unintelligible or worse    11. Extinction and Inattention (Use information from prior testing to identify neglect or  double simultaneous stimuli testing): 0= No neglect    Total NIH Score: 28    Baseline modified Tyler Scale (MRS): 1 = No significant disability, despite symptoms; able to perform all usual duties and activities    Objective Data:    Labs:  No results found for: \"PROTHROMBTM\", \"INR\"   Lab Results   Component Value Date/Time    WBC 10.2 10/29/2023 03:06 PM    RBC 4.75 10/29/2023 03:06 PM    HEMOGLOBIN 15.0 10/29/2023 03:06 PM    HEMATOCRIT 43.7 10/29/2023 03:06 PM    MCV 92.0 10/29/2023 03:06 PM    MCH 31.6 10/29/2023 03:06 PM    MCHC 34.3 10/29/2023 03:06 PM    MPV 10.4 10/29/2023 03:06 PM    NEUTSPOLYS 77.40 (H) 10/29/2023 03:06 PM    LYMPHOCYTES 10.90 (L) 10/29/2023 03:06 PM    MONOCYTES 9.90 10/29/2023 03:06 PM    EOSINOPHILS 0.90 10/29/2023 03:06 PM    BASOPHILS 0.40 10/29/2023 03:06 PM      No results found for: \"SODIUM\", \"POTASSIUM\", \"CHLORIDE\", \"CO2\", \"GLUCOSE\", \"BUN\", \"CREATININE\", \"BUNCREATRAT\", \"GLOMRATE\"   No results found for: \"CHOLSTRLTOT\", \"LDL\", \"HDL\", \"TRIGLYCERIDE\"    No results found for: \"ALKPHOSPHAT\", \"ASTSGOT\", \"ALTSGPT\", \"TBILIRUBIN\"     Imaging/Testing:    I interpreted and/or reviewed the patient's neuroimaging    CT-CEREBRAL PERFUSION ANALYSIS    (Results Pending)       Assessment:  Mateusz Luciano is a 78 y.o. male with history of metastatic adenocarcinoma of the lung who is transferred from [facility] after he was admitted on 10/26/2023 due to PE and multiple embolic infarct.  Per my discussion with internal medicine attending at [facility], patient was initially on Xarelto and subsequently switched to Lovenox and then Eliquis, however continue to have embolic events.  This morning at around 9 AM he " was found to be unresponsive with last known normal at around 2 AM.  He underwent CT angiogram of the head and neck which revealed left M1 occlusion for which he was transferred to [facility] for higher level of care and possible thrombectomy.  He is currently unresponsive with occasional spontaneous minimal right upper extremity movement.  CT perfusion revealed 111 cc of mismatch penumbra in distribution of both MCA.  Case was discussed with Dr. Wilde, neuro IR and patient will be taken to IR for angiogram and possible thrombectomy.  Given underlying metastatic lung cancer and multiple embolic stroke and PE, he is likely in hypercoagulable state and requires anticoagulation.    Plan:  -Neuro check and vital signs per post thrombectomy protocol.  - After the endovascular intervention, please follow the following recommendations regarding blood pressure parameters    If TICI 2c/3: maintain systolic -140  If TICI 2b: maintain systolic 120-160  If TICI 2a or less, maintain systolic -180     This is in an effort to minimize reperfusion injury and/or hemorrhagic conversion.     -Obtain MRI Brain wo contrast, please expedite as patient requires anticoagulation if no hemorrhage after thrombectomy.  -Telemetry; currently SR.   -Likely hypercoagulable state and requires anticoagulation, however timing dependent on MRI brain result after thrombectomy.  -Recommend aggressive BG management per primary team. Avoid IVF with Dextrose. -BG goal . Check hemoglobin A1c.  Goal < 7  -PT/OT/SLP eval and treat for early mobilization.  -All other medical management per primary team.   -DVT PPX: SCDs.      The plan of care above has been discussed with Omari Crawford M.D.    Upon my evaluation, this patient had a high probability of imminent or life-threatening deterioration due to cerebrovascular accident which required my direct attention, intervention, and personal management.  I personally provided 45 minutes of  total critical care time. Time includes: review of laboratory data, review of radiology studies, discussion with consultants an ER physician, monitoring for potential decompensation.     Please note that this dictation was created using voice recognition software. I have made every reasonable attempt to correct obvious errors, but I expect that there are errors of grammar and possibly content that I did not discover before finalizing the note.       Lona Rich MD  [facility]

## 2023-10-29 NOTE — ED PROVIDER NOTES
"ED Provider Note    CHIEF COMPLAINT  Left M1 stroke    LIMITATION TO HISTORY   Patient is nonverbal    HPI    Mateusz Luciano is a 78 y.o. male who presents to the Emergency Department ambulance as a transfer from [facility] inpatient status.  Was admitted a couple of days ago with multiple strokes.  He at 1 on angio he was diagnosed with an M1 occlusion at around 1 PM.  This was discussed with neurology Dr. Sharp here who recommended he be transferred to the ER for emergent interventional radiology.  Patient has been somnolent and not arousable today.  He is moving both his right and his left arms and legs.  He is anticoagulated.  History provided by medics and Dr. Sharp.  No further history available    OUTSIDE HISTORIAN(S):  Medics and the neurologist    EXTERNAL RECORDS REVIEWED  Hospitalist paperwork from [facility] reviewed and patient's potassium has been in the low threes.  There is no renal failure.    REVIEW OF SYSTEMS  Pertinent positives include: Multiple strokes, obtunded.     PAST MEDICAL HISTORY  thromboEmbolic stroke  Hypertension  Diabetes  Dyslipidemia    SOCIAL HISTORY     Here with his wife    CURRENT MEDICATIONS  See epic    ALLERGIES  Not on File    PHYSICAL EXAM  VITAL SIGNS: /61   Pulse 69   Temp 36.1 °C (97 °F) (Temporal)   Resp (!) 22   Ht 1.854 m (6' 1\")   Wt 104 kg (230 lb)   SpO2 97%   BMI 30.34 kg/m²   Reviewed and tachypneic,  Constitutional: Well developed, Well nourished, ill-appearing somnolent, does not arouse to sternal rub..  HENT: Normocephalic, atraumatic, bilateral external ears normal, No intraoral erythema, edema, exudate, congested airway, no gag  Eyes: PERRLA, conjunctiva pink, no scleral icterus.   Cardiovascular: Regular rate and rhythm. No murmurs, rubs or gallops.  No dependent edema or calf tenderness  Respiratory: Tachypneic lungs clear to auscultation bilaterally. No wheezes, rales, or rhonchi.  Abdominal:  Abdomen soft, non-tender, non distended. " No rebound, or guarding.    Skin: No erythema, no rash. No wounds or bruising.  Genitourinary: No costovertebral angle tenderness.   Musculoskeletal: no deformities.   Neurologic:  Pupils 2 with some deviation to the right.  Withdraws all extremities to pain right more briskly than left.  Stroke score by     LABS Ordered and Reviewed by Me:  Results for orders placed or performed during the hospital encounter of 10/29/23   CBC WITH DIFFERENTIAL   Result Value Ref Range    WBC 10.2 4.8 - 10.8 K/uL    RBC 4.75 4.70 - 6.10 M/uL    Hemoglobin 15.0 14.0 - 18.0 g/dL    Hematocrit 43.7 42.0 - 52.0 %    MCV 92.0 81.4 - 97.8 fL    MCH 31.6 27.0 - 33.0 pg    MCHC 34.3 32.3 - 36.5 g/dL    RDW 46.7 35.9 - 50.0 fL    Platelet Count 141 (L) 164 - 446 K/uL    MPV 10.4 9.0 - 12.9 fL    Neutrophils-Polys 77.40 (H) 44.00 - 72.00 %    Lymphocytes 10.90 (L) 22.00 - 41.00 %    Monocytes 9.90 0.00 - 13.40 %    Eosinophils 0.90 0.00 - 6.90 %    Basophils 0.40 0.00 - 1.80 %    Immature Granulocytes 0.50 0.00 - 0.90 %    Nucleated RBC 0.00 0.00 - 0.20 /100 WBC    Neutrophils (Absolute) 7.92 (H) 1.82 - 7.42 K/uL    Lymphs (Absolute) 1.12 1.00 - 4.80 K/uL    Monos (Absolute) 1.01 (H) 0.00 - 0.85 K/uL    Eos (Absolute) 0.09 0.00 - 0.51 K/uL    Baso (Absolute) 0.04 0.00 - 0.12 K/uL    Immature Granulocytes (abs) 0.05 0.00 - 0.11 K/uL    NRBC (Absolute) 0.00 K/uL   COMP METABOLIC PANEL   Result Value Ref Range    Sodium 136 135 - 145 mmol/L    Potassium 4.4 3.6 - 5.5 mmol/L    Chloride 101 96 - 112 mmol/L    Co2 23 20 - 33 mmol/L    Anion Gap 12.0 7.0 - 16.0    Glucose 116 (H) 65 - 99 mg/dL    Bun 11 8 - 22 mg/dL    Creatinine 0.80 0.50 - 1.40 mg/dL    Calcium 10.0 8.5 - 10.5 mg/dL    Correct Calcium 10.0 8.5 - 10.5 mg/dL    AST(SGOT) 44 12 - 45 U/L    ALT(SGPT) 31 2 - 50 U/L    Alkaline Phosphatase 103 (H) 30 - 99 U/L    Total Bilirubin 0.5 0.1 - 1.5 mg/dL    Albumin 4.0 3.2 - 4.9 g/dL    Total Protein 7.0 6.0 - 8.2 g/dL    Globulin  3.0 1.9 - 3.5 g/dL    A-G Ratio 1.3 g/dL   PROTHROMBIN TIME   Result Value Ref Range    PT 15.4 (H) 12.0 - 14.6 sec    INR 1.21 (H) 0.87 - 1.13   APTT   Result Value Ref Range    APTT 35.2 24.7 - 36.0 sec   COD (ADULT)   Result Value Ref Range    ABO Grouping Only O     Rh Grouping Only POS     Antibody Screen-Cod NEG    TROPONIN   Result Value Ref Range    Troponin T 274 (H) 6 - 19 ng/L   Result Value Ref Range    GFR (CKD-EPI) 91 >60 mL/min/1.73 m 2   PROCALCITONIN   Result Value Ref Range    Procalcitonin <0.05 <0.25 ng/mL   TSH WITH REFLEX TO FT4   Result Value Ref Range    TSH 3.830 0.380 - 5.330 uIU/mL   Lipid Profile   Result Value Ref Range    Cholesterol,Tot 129 100 - 199 mg/dL    Triglycerides 97 0 - 149 mg/dL    HDL 46 >=40 mg/dL    LDL 64 <100 mg/dL   HEMOGLOBIN A1C   Result Value Ref Range    Glycohemoglobin 6.2 (H) 4.0 - 5.6 %    Est Avg Glucose 131 mg/dL   POCT arterial blood gas device results   Result Value Ref Range    Ph 7.429 7.400 - 7.500    Pco2 30.5 26.0 - 37.0 mmHg    Po2 87 64 - 87 mmHg    Tco2 21 20 - 33 mmol/L    S02 97 93 - 99 %    Hco3 20.2 17.0 - 25.0 mmol/L    BE -3 -4 - 3 mmol/L    Body Temp 99.9 F degrees    O2 Therapy 40 %    iPF Ratio 218     Ph Temp Carmelo 7.418 7.400 - 7.500    Pco2 Temp Co 31.5 26.0 - 37.0 mmHg    Po2 Temp Cor 91 (H) 64 - 87 mmHg    Specimen Arterial     DelSys Vent     End Tidal Carbon Dioxide 21 mmhg    Tidal Volume 480 mL    Peep End Expiratory Pressure 8 cmh20    Set Rate 26     Mode APV-CMV        EKG Interpretation by me    Indication: Stroke    Rhythm: normal sinus   Rate: Normal at 65  Axis: normal  Ectopy: none  Conduction: normal  ST/T Waves: no acute change  Q Waves: none  R Wave progression: normal  Hypertrophy changes: Absent    Clinical Impression: Normal sinus rhythm    RADIOLOGY  I have independently interpreted the cerebral perfusion associated with this visit demonstrating lateral perfusion deficits.  I am awaiting the final reading from the  radiologist.     Final Radiology Report  DX-ABDOMEN FOR TUBE PLACEMENT   Final Result      Feeding tube tip projects in the second portion of duodenum.      Nasogastric tube tip is in the distal stomach or first portion of the duodenum.      DX-CHEST-LIMITED (1 VIEW)   Final Result         Intubated. No airspace consolidation identified.      CT-CEREBRAL PERFUSION ANALYSIS   Final Result      1.  Cerebral blood flow less than 30% likely representing completed infarct = 15 mL.      2.  T Max more than 6 seconds likely representing combination of completed infarct and ischemia = 126 mL.      3.  Mismatched volume likely representing ischemic brain/penumbra = 111      4.  Please note that the cerebral perfusion was performed on the limited brain tissue around the basal ganglia region. Infarct/ischemia outside the CT perfusion sections can be missed in this study.     Radiologist interpretation have been reviewed by me.     ED COURSE:      INTERVENTIONS BY ME:  Seizure: Intubation.  Indication inability to protect airway.  Patient was given etomidate 20 and succinylcholine 150 mg.  He was preoxygenated with high flow facemask oxygen.  A paramedic student and I attempted to visualize the cords with direct laryngoscopy but due to short chin we were unable to directly visualize the cords.  The patient's O2 saturations remained above 98%.  I attempted again with a glide scope and intubated the patient on the first attempt.  Tube placement confirmed by condensation, colorimetric capnography and auscultation.  Chest x-ray demonstrated adequate tube placement.      I have discussed management of the patient with the following physicians and sources:   Management discussed with neurologist     Management discussed with intensivist Dr. Crawford    I Discussed the patient's condition and the treatment plan with his wife and extended family.      MEDICAL DECISION MAKING:  PROBLEMS EVALUATED THIS VISIT:  This patient  presents with multiple probable embolic strokes.  This is despite anticoagulation.  He has a subacute stroke that is amenable to interventional radiology but not thrombolysis.  He was admitted from the ER directly to interventional radiology.  The Patient also had respiratory failure and was unable to protect his airway requiring endotracheal intubation by me.    RISK:  High given need for escalation of care to include ICU admission     PLAN:  Interventional radiology, neurology consultation, respiratory and ventilatory support, PT and OT    CONDITION: Hearted, critical care warranted given multiple strokes and respiratory failure with risk to life.  Critical care time not including procedures 40 minutes.     FINAL IMPRESSION  1. Acute ischemic left middle cerebral artery (MCA) stroke (HCC)    2.      Endotracheal intubation  Critical care    Electronically signed by: Glenroy Moralez M.D., 10/29/2023 3:07 PM

## 2023-10-29 NOTE — CONSULTS
Critical Care Consultation    Date of consult: 10/29/2023    Referring Physician  Glenroy Moralez M.D.    Reason for Consultation  R ICA thrombus and L MCA occlusion     History of Presenting Illness  78 y.o. male with a PMHx DVT/PE on xarelto, metastatic lung adenocarcioma biopsy from supraclavicular node 10/24, GERD, HTN who was recently hospitalized for multiple territory strokes.    From the records it appears he was inpatient at [facility] then was transferred to [facility] for perfusion study and possible IR intervention after he had a change in his neuro exam this AM. He was previously aphasic from his last strokes then became L hemiparetic and unresponsive today - CTA R ICA thrombus and L MCA occlusion. He was sent to [facility] for possible endovascular clot retrieval. IR performed thrombectomy of both Left M1 Thrombu and Right M1/M2 junction thrombus.     Upon my evaluation the patient is altered and unable to provide any history at this time.     Code Status  Full Code    Review of Systems  Review of Systems   Unable to perform ROS: Critical illness       Past Medical History   has no past medical history on file.    Surgical History   has no past surgical history on file.    Family History  family history is not on file.    Social History       Medications  Home Medications       Reviewed by Santana Holder (Pharmacy Tech) on 10/29/23 at 1541  Med List Status: Unable to Obtain     Medication Last Dose Status        Patient Pascual Taking any Medications                         Current Facility-Administered Medications   Medication Dose Route Frequency Provider Last Rate Last Admin    cefTRIAXone (Rocephin) syringe 1,000 mg  1,000 mg Intravenous Q24HRS Omari Crawford M.D.   1,000 mg at 10/29/23 1815    azithromycin (ZITHROMAX) 500 mg in D5W 250 mL IVPB premix  500 mg Intravenous Q24HRS Omari Crawford M.D. 250 mL/hr at 10/29/23 1820 500 mg at 10/29/23 1820    Respiratory Therapy Consult   Nebulization  Continuous RT Omari Crawford M.D.        famotidine (Pepcid) tablet 20 mg  20 mg Enteral Tube Q12HRS Omari Crawford M.D.        Or    famotidine (Pepcid) injection 20 mg  20 mg Intravenous Q12HRS Omari Crawford M.D. MD Alert...ICU Electrolyte Replacement per Pharmacy   Other PHARMACY TO DOSE Omari Crawford M.D.        lidocaine (Xylocaine) 1 % injection 2 mL  2 mL Tracheal Tube Q30 MIN PRN Omari Crawford M.D.        fentaNYL (Sublimaze) injection 50 mcg  50 mcg Intravenous Q15 MIN PRN Omari Crawford M.D.        And    fentaNYL (Sublimaze) injection 100 mcg  100 mcg Intravenous Q15 MIN PRN Omari Crawford M.D.        And    fentaNYL (SUBLIMAZE) 50 mcg/mL in 50mL (Continuous Infusion)   Intravenous Continuous Omari Crawford M.D.        insulin regular (HumuLIN R,NovoLIN R) injection  1-6 Units Subcutaneous Q6HRS Omari Crawford M.D.        And    dextrose 10 % BOLUS 25 g  25 g Intravenous Q15 MIN PRN Omari Crawford M.D.        ipratropium-albuterol (DUONEB) nebulizer solution  3 mL Nebulization Q2HRS PRN (RT) Omari Crawford M.D.        labetalol (Normodyne/Trandate) injection 10 mg  10 mg Intravenous Q10 MIN PRN Sohan Thakur M.D.        hydrALAZINE (Apresoline) injection 10 mg  10 mg Intravenous Q2HRS PRN Sohan Thakur M.D.        niCARdipine (Cardene) 25 mg in  mL Standard Infusion  0-15 mg/hr Intravenous Continuous Sohan Thakur M.D.        propofol (DIPRIVAN) injection  0-80 mcg/kg/min (Ideal) Intravenous Continuous Omari Crawford M.D.   Paused at 10/29/23 1818    Pharmacy Consult: Enteral tube insertion - review meds/change route/product selection  1 Each Other PHARMACY TO DOSE Omari Crawford M.D.        atorvastatin (Lipitor) tablet 40 mg  40 mg Enteral Tube Q EVENING Omari Crawford M.D.        senna-docusate (Pericolace Or Senokot S) 8.6-50 MG per tablet 2 Tablet  2 Tablet Enteral Tube BID Omari Crawford M.D.        And    polyethylene  glycol/lytes (Miralax) PACKET 1 Packet  1 Packet Enteral Tube QDAY PRN Omari Crawford M.D.        And    magnesium hydroxide (Milk Of Magnesia) suspension 30 mL  30 mL Enteral Tube QDAY PRN Omari Crawford M.D.        And    bisacodyl (Dulcolax) suppository 10 mg  10 mg Rectal QDAY PRN Omari Crawford M.D.        norepinephrine (Levophed) 8 mg in 250 mL NS infusion (premix)  0-1 mcg/kg/min (Ideal) Intravenous Continuous Omari Crawford M.D.        [COMPLETED] iohexol (OMNIPAQUE) 300 mg/mL  65 mL Intra-arterial Once Sohan Thakur M.D.   65 mL at 10/29/23 1830       Allergies  Allergies   Allergen Reactions    Penicillins        Vital Signs last 24 hours  Temp:  [36.1 °C (97 °F)] 36.1 °C (97 °F)  Pulse:  [] 64  Resp:  [16-26] 25  BP: ()/(63-93) 93/74  SpO2:  [91 %-100 %] 99 %    Physical Exam  Physical Exam  Vitals and nursing note reviewed.   Constitutional:       Appearance: He is toxic-appearing.   HENT:      Head: Normocephalic and atraumatic.      Right Ear: External ear normal.      Left Ear: External ear normal.      Nose: Nose normal.      Mouth/Throat:      Mouth: Mucous membranes are moist.   Eyes:      Pupils: Pupils are equal, round, and reactive to light.   Cardiovascular:      Rate and Rhythm: Normal rate.      Pulses: Normal pulses.   Pulmonary:      Comments: Mild tachypnea, audible upper airway rattling  Abdominal:      General: Abdomen is flat. There is no distension.      Palpations: Abdomen is soft.      Tenderness: There is no abdominal tenderness. There is no guarding or rebound.   Musculoskeletal:      Right lower leg: No edema.      Left lower leg: No edema.   Skin:     General: Skin is warm and dry.      Capillary Refill: Capillary refill takes less than 2 seconds.   Neurological:      Comments: Somnolent, moves R side but left is flaccid, unresponsive, does not follow         Fluids  No intake or output data in the 24 hours ending 10/29/23 1829    Laboratory  Recent  Results (from the past 48 hour(s))   CBC WITH DIFFERENTIAL    Collection Time: 10/29/23  3:06 PM   Result Value Ref Range    WBC 10.2 4.8 - 10.8 K/uL    RBC 4.75 4.70 - 6.10 M/uL    Hemoglobin 15.0 14.0 - 18.0 g/dL    Hematocrit 43.7 42.0 - 52.0 %    MCV 92.0 81.4 - 97.8 fL    MCH 31.6 27.0 - 33.0 pg    MCHC 34.3 32.3 - 36.5 g/dL    RDW 46.7 35.9 - 50.0 fL    Platelet Count 141 (L) 164 - 446 K/uL    MPV 10.4 9.0 - 12.9 fL    Neutrophils-Polys 77.40 (H) 44.00 - 72.00 %    Lymphocytes 10.90 (L) 22.00 - 41.00 %    Monocytes 9.90 0.00 - 13.40 %    Eosinophils 0.90 0.00 - 6.90 %    Basophils 0.40 0.00 - 1.80 %    Immature Granulocytes 0.50 0.00 - 0.90 %    Nucleated RBC 0.00 0.00 - 0.20 /100 WBC    Neutrophils (Absolute) 7.92 (H) 1.82 - 7.42 K/uL    Lymphs (Absolute) 1.12 1.00 - 4.80 K/uL    Monos (Absolute) 1.01 (H) 0.00 - 0.85 K/uL    Eos (Absolute) 0.09 0.00 - 0.51 K/uL    Baso (Absolute) 0.04 0.00 - 0.12 K/uL    Immature Granulocytes (abs) 0.05 0.00 - 0.11 K/uL    NRBC (Absolute) 0.00 K/uL   COMP METABOLIC PANEL    Collection Time: 10/29/23  3:06 PM   Result Value Ref Range    Sodium 136 135 - 145 mmol/L    Potassium 4.4 3.6 - 5.5 mmol/L    Chloride 101 96 - 112 mmol/L    Co2 23 20 - 33 mmol/L    Anion Gap 12.0 7.0 - 16.0    Glucose 116 (H) 65 - 99 mg/dL    Bun 11 8 - 22 mg/dL    Creatinine 0.80 0.50 - 1.40 mg/dL    Calcium 10.0 8.5 - 10.5 mg/dL    Correct Calcium 10.0 8.5 - 10.5 mg/dL    AST(SGOT) 44 12 - 45 U/L    ALT(SGPT) 31 2 - 50 U/L    Alkaline Phosphatase 103 (H) 30 - 99 U/L    Total Bilirubin 0.5 0.1 - 1.5 mg/dL    Albumin 4.0 3.2 - 4.9 g/dL    Total Protein 7.0 6.0 - 8.2 g/dL    Globulin 3.0 1.9 - 3.5 g/dL    A-G Ratio 1.3 g/dL   PROTHROMBIN TIME    Collection Time: 10/29/23  3:06 PM   Result Value Ref Range    PT 15.4 (H) 12.0 - 14.6 sec    INR 1.21 (H) 0.87 - 1.13   APTT    Collection Time: 10/29/23  3:06 PM   Result Value Ref Range    APTT 35.2 24.7 - 36.0 sec   COD (ADULT)    Collection Time: 10/29/23   3:06 PM   Result Value Ref Range    ABO Grouping Only O     Rh Grouping Only POS     Antibody Screen-Cod NEG    TROPONIN    Collection Time: 10/29/23  3:06 PM   Result Value Ref Range    Troponin T 274 (H) 6 - 19 ng/L   ESTIMATED GFR    Collection Time: 10/29/23  3:06 PM   Result Value Ref Range    GFR (CKD-EPI) 91 >60 mL/min/1.73 m 2   PROCALCITONIN    Collection Time: 10/29/23  3:06 PM   Result Value Ref Range    Procalcitonin <0.05 <0.25 ng/mL   TSH WITH REFLEX TO FT4    Collection Time: 10/29/23  3:06 PM   Result Value Ref Range    TSH 3.830 0.380 - 5.330 uIU/mL   Lipid Profile    Collection Time: 10/29/23  3:06 PM   Result Value Ref Range    Cholesterol,Tot 129 100 - 199 mg/dL    Triglycerides 97 0 - 149 mg/dL    HDL 46 >=40 mg/dL    LDL 64 <100 mg/dL   HEMOGLOBIN A1C    Collection Time: 10/29/23  3:06 PM   Result Value Ref Range    Glycohemoglobin 6.2 (H) 4.0 - 5.6 %    Est Avg Glucose 131 mg/dL   ABO Rh Confirm    Collection Time: 10/29/23  3:57 PM   Result Value Ref Range    ABO Rh Confirm O POS        Imaging  DX-CHEST-LIMITED (1 VIEW)   Final Result         Intubated. No airspace consolidation identified.      CT-CEREBRAL PERFUSION ANALYSIS   Final Result      1.  Cerebral blood flow less than 30% likely representing completed infarct = 15 mL.      2.  T Max more than 6 seconds likely representing combination of completed infarct and ischemia = 126 mL.      3.  Mismatched volume likely representing ischemic brain/penumbra = 111      4.  Please note that the cerebral perfusion was performed on the limited brain tissue around the basal ganglia region. Infarct/ischemia outside the CT perfusion sections can be missed in this study.      EC-ECHOCARDIOGRAM COMPLETE W/O CONT    (Results Pending)   IR-THROMBO MECHANICAL ARTERY,INIT    (Results Pending)   DX-ABDOMEN FOR TUBE PLACEMENT    (Results Pending)       Assessment/Plan  * Acute ischemic stroke (HCC)- (present on admission)  Assessment & Plan  10/24 he was  admitted for multi-territory infarcts and aphasia   10/29 new neuro deficits, L hemiparesis and unresponsiveness  10/29/23 Jorge performed Left M1 Thrombus and Right M1/M2 junction thrombectomy    Q1 hour neuro checks  HOB > 30 degrees  Maintain normal temperature  Goal glucose 140-180  MRI ordered  ECHO ordered  Neurology following  -160  Lipid panel, Hgb A1c  SLP    VTE (venous thromboembolism)  Assessment & Plan  History of DVT and PE, unclear the details as we do not have the records  He will need therapeutic AC for both this and his cardioembolic strokes when cleared by neurology    Metastatic adenocarcinoma (HCC)  Assessment & Plan  Felt to be lung as primary; supraclavicular node biopsied at [facility] 10/24 and + for adenocarcinoma    Further evaluation when critical illness resolves    Acute respiratory failure with hypoxia (HCC)  Assessment & Plan  10/29/23 intubated by ERP for airway protection in the setting of acute ischemic stroke    Lung protective ventilation strategies  Optimize oxygenation, ventilation, and acid base balance  ABCDEF Bundle     Pneumonia  Assessment & Plan  [facility] was treating the patient for presumed PNA  Check PCT  MRSA nare  Empiric C3 + AZT; will d/c if PCT negative        Discussed patient condition and risk of morbidity and/or mortality with RN, RT, Pharmacy, Charge nurse / hot rounds, and Patient.    The patient remains critically ill.  Critical care time = 107 minutes in directly providing and coordinating critical care and extensive data review.  No time overlap and excludes procedures.

## 2023-10-29 NOTE — ED TRIAGE NOTES
Patient to ED via EMS from [facility] for new Left MCA stroke. He was in that hospital started 1026 for PE and CVA with right side deficits. Last known well at approx 0200. At 0900 staff noticed somnolence. Pt had MRI at outside hospital +new stroke.     Pt is somnolent on arrival to ED. Moves right arm and leg. Does not follow commands. Non verbal. PERRLA.

## 2023-10-29 NOTE — PROGRESS NOTES
Cerebral angiogram with mechanical thrombectomy for bilateral stroke thrombectomy MCAs, left M1 and right M2 occlusion by Dr. Thakur. Emergent consent. Pre-procedure posterior tibial pulses dopplered. Right femoral artery access site. Pt placed on monitor, prepped and draped in a sterile fashion. Vital signs were taken every 5 minutes and remained within parameters (see doc flow sheets). Penumbra system was used to retrieve clot. Hemostasis achieved using angio-seal deployed at 1730. Report given to MITESH Mehta. Pt was transported to ICU with RN and monitor to R112. Stroke worksheet review during warm handoff with MITESH Mehta. ICU responsible for serial checks starting at 1745. See Stroke Procedure flowsheet for VS, neuro, access, extremity serial assessments.    LKW: 0200  NIH: 34  Time in IR: 1615  Access: 1644  1st angio: 1647  1st pass: 1651 (LEFT M1)  2nd pass: 1657 (LEFT M1) (Attempting to retrieve residual clot)  1st pass: 1715 (RIGHT M2)  2nd pass: 1720 (RIGHT M2)  Closure (end time): 1730    Goal SBP per MD: 120 to 160 systolic    TICI score 2b @ 1654 (LEFT M1)  TICI score 2b @ 1721 (RIGHT M2)    Post procedure posterior tibial pulses: Dopplered    Angio-Seal 6 Fr  REF: [id number]  LOT: [id number]  EXP: 2024-06-11

## 2023-10-29 NOTE — ED NOTES
UNABLE to address med rec at this time  Pt unable to participate in interview  No family or pharmacy listed in demographics

## 2023-10-30 NOTE — PROGRESS NOTES
Critical Care Progress Note    Date of admission  10/29/2023    Chief Complaint  78 y.o. male admitted 10/29/2023 with changes in LOC    Hospital Course  Mateusz Luciano is a 78-year-old male with a past medical history of DVT/PE (Xarelto) metastatic adenocarcinoma of the lung hypertension, GERD who was recently hospitalized at [facility] on 10/26/2023 due to PE and multiple territory embolic infarcts.  Apparently he was initially started on Xarelto, changed to Lovenox and then  Eliquis, however he continued to experience embolic events.  On the morning of 10/29 at approximately 0900 he was found unresponsive -LKW 0200.  He underwent CT angio of head and neck which showed a left M1 occlusion therefore he was transferred to [facility] for higher level of care possible IR intervention.  Upon arrival to [facility] he was intubated and transported to IR where he underwent thrombectomies for a left M1 thrombus and right M1/M2 junction thrombus, both resulting in TICI 2B.  He was transferred to ICU for critical care management.    Interval Problem Update  Reviewed last 24 hour events:      -MRI- Ordered and pending   -CXR- Hazy infiltrates   -Propofol actively titrating off- only responding to painful stimuli  -Levophed with active titration to keep -160  -120-160 - levo      Review of Systems  Review of Systems   Unable to perform ROS: Intubated        Vital Signs for last 24 hours   Temp:  [36.1 °C (97 °F)] 36.1 °C (97 °F)  Pulse:  [] 95  Resp:  [13-27] 25  BP: ()/(51-93) 126/61  SpO2:  [91 %-100 %] 96 %    Hemodynamic parameters for last 24 hours       Respiratory Information for the last 24 hours  Vent Mode: APVCMV  Rate (breaths/min): 18  Vt Target (mL): 480  PEEP/CPAP: 8  MAP: 12  Control VTE (exp VT): 485    Physical Exam   Physical Exam  Vitals and nursing note reviewed.   Constitutional:       Interventions: He is intubated.   HENT:      Head: Normocephalic and atraumatic.       Mouth/Throat:      Mouth: Mucous membranes are moist.      Pharynx: Oropharynx is clear.   Cardiovascular:      Rate and Rhythm: Regular rhythm. Bradycardia present.      Pulses:           Radial pulses are 2+ on the right side and 2+ on the left side.      Heart sounds: Heart sounds are distant.      Comments: Pedal pulses palpated however difficult to palpate bilateral lower feet  Pulmonary:      Effort: He is intubated.      Breath sounds: Examination of the right-lower field reveals decreased breath sounds. Examination of the left-lower field reveals decreased breath sounds. Decreased breath sounds present.   Chest:   Breasts:     Breasts are symmetrical.   Abdominal:      Comments: Abdomen soft, round, nondistended sounds present     Genitourinary:     Comments: Trivedi to down drain with clear yellow urine  Musculoskeletal:      Right lower leg: No edema.      Left lower leg: No edema.   Skin:     General: Skin is cool.      Capillary Refill: Capillary refill takes 2 to 3 seconds.      Comments: Cores warm extremities cool   Neurological:      Comments: Intubated and sedated  Slight movement to painful stimuli only  BRAULIO 2mm    Psychiatric:      Comments: Intubated and unresponsive         Medications  Current Facility-Administered Medications   Medication Dose Route Frequency Provider Last Rate Last Admin    cefTRIAXone (Rocephin) syringe 1,000 mg  1,000 mg Intravenous Q24HRS Omari Crawford M.D.   1,000 mg at 10/30/23 0526    azithromycin (ZITHROMAX) 500 mg in D5W 250 mL IVPB premix  500 mg Intravenous Q24HRS Omari Crawford M.D.   Stopped at 10/30/23 0629    Respiratory Therapy Consult   Nebulization Continuous RT Omari Crawford M.D.        famotidine (Pepcid) tablet 20 mg  20 mg Enteral Tube Q12HRS Omari Crawford M.D.   20 mg at 10/30/23 0531    Or    famotidine (Pepcid) injection 20 mg  20 mg Intravenous Q12HRS Omari Crawford M.D.        MD Alert...ICU Electrolyte Replacement per Pharmacy    Other PHARMACY TO DOSE Omari Crawford M.D.        lidocaine (Xylocaine) 1 % injection 2 mL  2 mL Tracheal Tube Q30 MIN PRN Omari Crawford M.D.        fentaNYL (Sublimaze) injection 50 mcg  50 mcg Intravenous Q15 MIN PRN Omari Crawford M.D.        And    fentaNYL (Sublimaze) injection 100 mcg  100 mcg Intravenous Q15 MIN PRN Omari Crawford M.D.        And    fentaNYL (SUBLIMAZE) 50 mcg/mL in 50mL (Continuous Infusion)   Intravenous Continuous Omari Crawford M.D.   Dose not Required at 10/29/23 1800    insulin regular (HumuLIN R,NovoLIN R) injection  1-6 Units Subcutaneous Q6HRS Omari Crawford M.D.        And    dextrose 10 % BOLUS 25 g  25 g Intravenous Q15 MIN PRN Omari Crawford M.D.        ipratropium-albuterol (DUONEB) nebulizer solution  3 mL Nebulization Q2HRS PRN (RT) Omari Crawford M.D.        labetalol (Normodyne/Trandate) injection 10 mg  10 mg Intravenous Q10 MIN PRN Sohan Thakur M.D.        hydrALAZINE (Apresoline) injection 10 mg  10 mg Intravenous Q2HRS PRN Sohan Thakur M.D.        niCARdipine (Cardene) 25 mg in  mL Standard Infusion  0-15 mg/hr Intravenous Continuous Sohan Thakur M.D.   Dose not Required at 10/29/23 1815    propofol (DIPRIVAN) injection  0-80 mcg/kg/min (Ideal) Intravenous Continuous Omari Crawford M.D.   Paused at 10/29/23 1818    Pharmacy Consult: Enteral tube insertion - review meds/change route/product selection  1 Each Other PHARMACY TO DOSE Omari Crawford M.D.        atorvastatin (Lipitor) tablet 40 mg  40 mg Enteral Tube Q EVENING Omari Crawford M.D.   40 mg at 10/29/23 2055    senna-docusate (Pericolace Or Senokot S) 8.6-50 MG per tablet 2 Tablet  2 Tablet Enteral Tube BID Omari Crawford M.D.   2 Tablet at 10/30/23 0530    And    polyethylene glycol/lytes (Miralax) PACKET 1 Packet  1 Packet Enteral Tube QDAY PRN Omari Crawford M.D.        And    magnesium hydroxide (Milk Of Magnesia) suspension 30 mL  30 mL Enteral  Tube QDAY PRN Omari Crawford M.D.        And    bisacodyl (Dulcolax) suppository 10 mg  10 mg Rectal QDAY PRN Omari Crawford M.D.        norepinephrine (Levophed) 8 mg in 250 mL NS infusion (premix)  0-1 mcg/kg/min (Ideal) Intravenous Continuous Omari Crawford M.D. 7.5 mL/hr at 10/29/23 1952 0.05 mcg/kg/min at 10/29/23 1952       Fluids    Intake/Output Summary (Last 24 hours) at 10/30/2023 0632  Last data filed at 10/30/2023 0600  Gross per 24 hour   Intake 1576.5 ml   Output 395 ml   Net 1181.5 ml       Laboratory  Recent Labs     10/29/23  1907 10/30/23  0301   ISTATAPH 7.429 7.452   ISTATAPCO2 30.5 28.9   ISTATAPO2 87 349*   ISTATATCO2 21 21   XZYJXNK0OOM 97 100*   ISTATARTHCO3 20.2 20.2   ISTATARTBE -3 -2   ISTATTEMP 99.9 F 100.0 F   ISTATFIO2 40 30   ISTATSPEC Arterial Arterial   ISTATAPHTC 7.418 7.440   AHWRJRIG2GE 91* 353*         Recent Labs     10/29/23  1506 10/30/23  0458   SODIUM 136 135   POTASSIUM 4.4 4.1   CHLORIDE 101 103   CO2 23 20   BUN 11 13   CREATININE 0.80 0.74   MAGNESIUM  --  1.9   PHOSPHORUS  --  3.0   CALCIUM 10.0 8.9     Recent Labs     10/29/23  1506 10/30/23  0458   ALTSGPT 31 27   ASTSGOT 44 34   ALKPHOSPHAT 103* 86   TBILIRUBIN 0.5 0.5   PREALBUMIN  --  14.7*   GLUCOSE 116* 133*     Recent Labs     10/29/23  1506 10/30/23  0458   WBC 10.2 11.8*   NEUTSPOLYS 77.40* 82.50*   LYMPHOCYTES 10.90* 7.60*   MONOCYTES 9.90 9.10   EOSINOPHILS 0.90 0.10   BASOPHILS 0.40 0.30   ASTSGOT 44 34   ALTSGPT 31 27   ALKPHOSPHAT 103* 86   TBILIRUBIN 0.5 0.5     Recent Labs     10/29/23  1506 10/30/23  0458   RBC 4.75 4.42*   HEMOGLOBIN 15.0 13.5*   HEMATOCRIT 43.7 41.3*   PLATELETCT 141* 123*   PROTHROMBTM 15.4*  --    APTT 35.2  --    INR 1.21*  --        Imaging  X-Ray:  I have personally reviewed the images and compared with prior images.  EKG:  I have personally reviewed the images and compared with prior images.  CT:    Reviewed    Assessment/Plan  * Acute ischemic stroke (HCC)- (present  on admission)  Assessment & Plan  10/24 he was admitted for multi-territory infarcts and aphasia   10/29 new neuro deficits, L hemiparesis and unresponsiveness  10/29/23 Jorge performed Left M1 Thrombus and Right M1/M2 junction thrombectomy  Neuro check and vital signs per post thrombectomy protocol.  Q1 hour neuro checks  HOB > 30 degrees  Maintain normal temperature  Goal glucose 140-180  Given underlying metastatic lung cancer and multiple embolic stroke and PE, he is likely in hypercoagulable state and requires anticoagulation.  MRI ordered-expedite MRI due to need for anticoagulation if no hemorrhage post thrombectomy due to likely hypercoagulable state   ECHO ordered and pending  -160  BG goal . hemoglobin A1c 6.2.  Goal < 7  Start High-dose statin therapy  SLP/PT/OT    VTE (venous thromboembolism)  Assessment & Plan  History of DVT and PE, unclear the details as we do not have the records  He will need therapeutic AC for both this and his cardioembolic strokes when cleared by neurology    Metastatic adenocarcinoma (HCC)  Assessment & Plan  Felt to be lung as primary; supraclavicular node biopsied at [facility] 10/24 and + for adenocarcinoma    Further evaluation when critical illness resolves    Acute respiratory failure with hypoxia (HCC)  Assessment & Plan  10/29/23 intubated by ERP for airway protection in the setting of acute ischemic stroke    Lung protective ventilation strategies  Optimize oxygenation, ventilation, and acid base balance  ABCDEF Bundle     Pneumonia  Assessment & Plan  [facility] was treating the patient for presumed PNA  Check PCT  MRSA nare  Empiric C3 + AZT; will d/c if PCT negative         VTE:  Contraindicated  Ulcer: H2 Antagonist  Lines: Trivedi Catheter  Ongoing indication addressed    I have performed a physical exam and reviewed and updated ROS and Plan today (10/30/2023). In review of yesterday's note (10/29/2023), there are no changes except as documented above.      Discussed patient condition and risk of morbidity and/or mortality with Family, RN, RT, Therapies, Pharmacy, Dietary, , Code status disscussed, Charge nurse / hot rounds, and neurology  The patient remains critically ill.  Critical care time = 70 minutes in directly providing and coordinating critical care and extensive data review.  No time overlap and excludes procedures.  Please note that this dictation was created using voice recognition software. The accuracy of the dictation is limited to the abilities of the software. I have made every reasonable attempt to correct obvious errors, but I expect that there are errors of grammar and possibly content that I did not discover before finalizing the note.

## 2023-10-30 NOTE — OR SURGEON
Immediate Post- Operative Note        Findings: Left M1 Thrombus  and Right M1/M2 junction thrombus       Procedure(s): Mechanical thrombectomy    Left side:TICI 2b    Right side :Tici 2b      Estimated Blood Loss: Less than 5 ml        Complications: None            10/29/2023     5:31 PM     Sohan Thakur M.D.

## 2023-10-30 NOTE — DISCHARGE PLANNING
[facility] Transitional Care Coordination    Referral from: Dr. Crawford    Insurance Provider on Facesheet: None listed @ this time.  Please reach out to a PFA for a screening.,    Potential Rehab Diagnosis: CVA    Chart review indicates patient may have on going medical management and may have therapy needs to possibly meet inpatient rehab facility criteria with the goal of returning to community.    D/C support will need to be verified: Spouse    Physiatry consultation pended per protocol.  Vented day 2.     Thank you for the referral.

## 2023-10-30 NOTE — ASSESSMENT & PLAN NOTE
10/29/23 intubated by ERP for airway protection in the setting of acute ischemic stroke  -Titrate FiO2 to keep sats greater than 90%   -Titrate ventilator prescription to optimize acid-base balance, oxygenation and ventilation  -HOB > 30 degrees and peridex for VAP prevention  -daily SBT  -Pepcid for GI prophylaxis  -SAT/SBT when able (ABCDEF Bundle)  -Early mobility  -SCDs for DVT prophylaxis, chemical VTE contraindicated  -Pepcid, chlorhexidine  -Repeat chest xray and ABG PRN   -Titration of ventilator therapy based on ABGs and patient's status  -RT/O2 protocols  -Daily and PRN ABGs

## 2023-10-30 NOTE — ASSESSMENT & PLAN NOTE
10/24 he was admitted for multi-territory infarcts and aphasia   10/29 new neuro deficits, L hemiparesis and unresponsiveness  10/29/23 Jorge performed Left M1 Thrombus and Right M1/M2 junction thrombectomy   -Neuro checks and vital signs per post thrombectomy protocol.  -MRI-showed multifocal bilateral cerebral hemispheric and cerebellar acute infarcts; moderate area of infarction in right posterior frontal and anterior parietal region with hemorrhagic conversion; infarction of left caudate basal ganglia and corona radiata with hemorrhagic conversion  -Discussion with family -Discussed updates with wife on the phone   HOB > 30 degrees  -Maintain normal temperature  -Goal glucose 140-180  -Given underlying metastatic lung cancer and multiple embolic stroke and PE, he is likely in a hypercoagulable state who failed prvious anticoagulation.  -160  BG goal . hemoglobin A1c 6.2.  Goal < 7  Start High-dose statin therapy  No anticoagulation given stroke burden with hemorrhagic conversion  SLP/PT/OT

## 2023-10-30 NOTE — PROGRESS NOTES
Med rec complete per spouse/family over phone  Allergies reviewed.    Anticoagulants taken in the last 14 days? Yes   Anticoagulant: Xarelto, Lovenox and Eliquis, Last dose: UNK.     Per family the patient was placed on a Xarelto starter pack in September, was then changed to Lovenox 90 mg, then increased to Lovenox 120 mg, and most recently was put on Eliquis 5 mg BID when patient discharged from [facility] on 10/24/23.     Patient's Irbesartan/HCTZ was discontinued on 10/24/23 with instructions to start Irbesartan 75 mg daily on 10/30/23.    Preferred pharmacy for this visit - [facility] in [place]       Genie Guillen CPhT

## 2023-10-30 NOTE — PROGRESS NOTES
MRI -reveals Multifocal bilateral cerebral hemispheric and cerebellar acute infarcts.   Moderate-sized area of infarction noted in the right posterior frontal and anterior parietal region in the right MCA territory with minimal hemorrhage within but without mass effect or midline shift.   Confluent area of acute infarction involving the left caudate, basal ganglia and adjacent corona radiata with minimal hemorrhage within but without mass effect or midline shift    Neurology recommends holding off on starting ASA and or anticoagulation due to hemorrhagic conversion and high risk of bleeding with previous failed therapies.  Therefore we will not be starting ASA or any anticoagulation at this todd

## 2023-10-30 NOTE — PROGRESS NOTES
1 yellow metal ring with clear stones logged into [facility] safekeeping drawer. --removed from safekeeping and given to Wife 10/30

## 2023-10-30 NOTE — PROGRESS NOTES
4 Eyes Skin Assessment Completed by MITESH Mehta and MITESH Muniz.    Head WDL  Ears WDL  Nose WDL  Mouth WDL  Neck WDL  Breast/Chest Device from [facility]  Shoulder Blades WDL  Spine WDL  (R) Arm/Elbow/Hand WDL  (L) Arm/Elbow/Hand WDL  Abdomen WDL  Groin Incision  Scrotum/Coccyx/Buttocks WDL  (R) Leg WDL  (L) Leg WDL  (R) Heel/Foot/Toe WDL  (L) Heel/Foot/Toe WDL          Devices In Places ECG, Blood Pressure Cuff, Pulse Ox, Trivedi, SCD's, ET Tube, Cortrak, and OG/NG      Interventions In Place Sacral Mepilex, Pillows, Q2 Turns, and Low Air Loss Mattress    Possible Skin Injury No    Pictures Uploaded Into Epic N/A  Wound Consult Placed N/A  RN Wound Prevention Protocol Ordered No

## 2023-10-30 NOTE — HOSPITAL COURSE
Mateusz Luciano is a 78-year-old male with a past medical history of DVT/PE (Xarelto) metastatic adenocarcinoma of the lung hypertension, GERD who was recently hospitalized at [facility] on 10/26/2023 due to PE and multiple territory embolic infarcts.  Apparently he was initially started on Xarelto, changed to Lovenox and then  Eliquis, however he continued to experience embolic events.  On the morning of 10/29 at approximately 0900 he was found unresponsive -LKW 0200.  He underwent CT angio of head and neck which showed a left M1 occlusion therefore he was transferred to [facility] for higher level of care possible IR intervention.  Upon arrival to [facility] he was intubated and transported to IR where he underwent thrombectomies for a left M1 thrombus and right M1/M2 junction thrombus, both resulting in TICI 2B.  He was transferred to ICU for critical care management.   10/30-titrated off of all sedation with little improvement in neuro exam.  -MRI-showed multifocal bilateral cerebral hemispheric and cerebellar acute infarcts; moderate area of infarction in right posterior frontal and anterior parietal region with hemorrhagic conversion; infarction of left caudate basal ganglia and corona radiata with hemorrhagic conversion-Per Neuro  hold anticoagulation, ASA due to stroke burden with hemorrhagic conversion.   10/31- code status changed- DNR- I okay

## 2023-10-30 NOTE — PROGRESS NOTES
Family updated on patient MRI results with Dr. Nguyen from neurology.  We discussed goals of care and treatment.  The plan at this time is to give the patient a few days off sedation and reassess his neuro status.  We discussed CPR/DNR status with the patient family and at this time they would like patient to remain full code.  They plan on discussing patient CODE STATUS with other family members and  will inform us if they want to change CODE STATUS to DNR.

## 2023-10-30 NOTE — ASSESSMENT & PLAN NOTE
History of DVT and PE, unclear the details as we do not have the records  He will need therapeutic AC for both this and his cardioembolic strokes when cleared by neurology

## 2023-10-30 NOTE — CARE PLAN
Problem: Ventilation  Goal: Ability to achieve and maintain unassisted ventilation or tolerate decreased levels of ventilator support  Description: Target End Date:  4 days     Document on Vent flowsheet    1.  Support and monitor invasive and noninvasive mechanical ventilation  2.  Monitor ventilator weaning response  3.  Perform ventilator associated pneumonia prevention interventions  4.  Manage ventilation therapy by monitoring diagnostic test results  Outcome: Not Met     Ventilator Daily Summary    Vent Day # 2    Airway: 8@26    Ventilator settings: 18/480+8@30%    Weaning trials: none    Respiratory Procedures: none    Plan: Continue current ventilator settings and wean mechanical ventilation as tolerated per physician orders.

## 2023-10-30 NOTE — DISCHARGE PLANNING
Case Management Discharge Planning    Admission Date: 10/29/2023  GMLOS: 4.6  ALOS: 1    6-Clicks ADL Score:    6-Clicks Mobility Score:        Anticipated Discharge Disposition: Disch to IP rehab facility or distinct part unit (62)    DME Needed: No    Action(s) Taken: Chart reviewed & case discussed in ICU rounds:   Patient is POD#1 s/p thrombectomy.  +intubated (day2)/OGT & Coretrak, not following commands, responding to painful stimuli.   Palliative consulted for GOC discussion.    CM met at bedside with patient's wife/Twila & niece to complete assessment and discuss DCP.    Info provided by patient's wife:  Patient resides in single level house with Twila/wife, was independent with ADLs, driving, and ambulating w/o assistive devices prior to admission.   No DME use reported.  He is retired, collectin SS benefits & pension (amount not known by wife).   Support system consists mainly of wife; Patient has no children.    Unable to determine post-acute needs at this time.  CM will continue to follow.    Escalations Completed: None    Medically Clear: No    Next Steps: CM will continue to follow for medical clearance and DC needs    Barriers to Discharge: Medical clearance    Is the patient up for discharge tomorrow: No      Care Transition Team Assessment    Information Source  Orientation Level: Unable to assess  Information Given By: Spouse  Informant's Name: Twila Luciano  Who is responsible for making decisions for patient? : Spouse  Name(s) of Primary Decision Maker: Twila Luciano    Readmission Evaluation  Is this a readmission?: No    Elopement Risk  Legal Hold: No  Ambulatory or Self Mobile in Wheelchair: No-Not an Elopement Risk  Elopement Risk: Not at Risk for Elopement     Discharge Preparedness  What is your plan after discharge?: Uncertain - pending medical team collaboration  What are your discharge supports?: Spouse  Prior Functional Level: Independent with Activities of Daily Living, Ambulatory, Drives  Self    Functional Assesment  Prior Functional Level: Independent with Activities of Daily Living, Ambulatory, Drives Self    Finances  Financial Barriers to Discharge: No  Prescription Coverage: Yes     Advance Directive  Advance Directive?: Living Will (Not on file)    Domestic Abuse  Have you ever been the victim of abuse or violence?: No    Psychological Assessment  History of Substance Abuse: None  History of Psychiatric Problems: No  Non-compliant with Treatment: No  Newly Diagnosed Illness: Yes    Discharge Risks or Barriers  Discharge risks or barriers?: Complex medical needs  Patient risk factors: Complex medical needs    Anticipated Discharge Information  Discharge Disposition: Disch to  rehab facility or distinct part unit (62)

## 2023-10-30 NOTE — CARE PLAN
The patient is Watcher - Medium risk of patient condition declining or worsening    Shift Goals  Clinical Goals: Stable Neuro exam, -160  Patient Goals: TOM  Family Goals: Updates    Progress made toward(s) clinical / shift goals:        Problem: Hemodynamic Monitoring  Goal: Patient's hemodynamics, fluid balance and neurologic status will be stable or improve  Outcome: Progressing     Problem: Respiratory - Stroke Patient  Goal: Patient will achieve/maintain optimum respiratory rate/effort  Outcome: Progressing     Problem: Risk for Aspiration  Goal: Patient's risk for aspiration will be absent or decrease  Outcome: Progressing     Problem: Skin Integrity  Goal: Skin integrity is maintained or improved  Outcome: Progressing     Problem: Pain - Standard  Goal: Alleviation of pain or a reduction in pain to the patient’s comfort goal  Outcome: Progressing       Patient is not progressing towards the following goals:

## 2023-10-30 NOTE — THERAPY
Speech Language Therapy Contact Note    Patient Name: Mateusz Luciano  Age:  78 y.o., Sex:  male  Medical Record #: [id number]  Today's Date: 10/30/2023       10/30/23 0732   Treatment Variance   Reason For Missed Therapy Medical - Patient not Able To Participate   Initial Contact Note    Initial Contact Note  Order Received and Verified, Speech Therapy Evaluation in Progress with Full Report to Follow.   Interdisciplinary Plan of Care Collaboration   IDT Collaboration with  Other (See Comments)   Collaboration Comments Per EMR, pt currently on vent. SLP to follow for a clinical swallow evaluation as pt is appropriate to participate.

## 2023-10-30 NOTE — PROGRESS NOTES
Neurology Progress Note  Neurohospitalist Service, [facility]    Referring Physician: KISHORE Sheikh*    Chief Complaint   Patient presents with    Possible Stroke       HPI: Refer to initial documented Neurology H&P, as detailed in the patient's chart.    Interval History 10/30: No significant events overnight.        Medications:    Current Facility-Administered Medications:     cefTRIAXone (Rocephin) syringe 1,000 mg, 1,000 mg, Intravenous, Q24HRS, Omari Crawford M.D., 1,000 mg at 10/30/23 0526    azithromycin (ZITHROMAX) 500 mg in D5W 250 mL IVPB premix, 500 mg, Intravenous, Q24HRS, Omari Crawford M.D., Stopped at 10/30/23 0629    Respiratory Therapy Consult, , Nebulization, Continuous RT, Omari Crawford M.D.    famotidine (Pepcid) tablet 20 mg, 20 mg, Enteral Tube, Q12HRS, 20 mg at 10/30/23 0531 **OR** famotidine (Pepcid) injection 20 mg, 20 mg, Intravenous, Q12HRS, Omari Crawford M.D.    MD Alert...ICU Electrolyte Replacement per Pharmacy, , Other, PHARMACY TO DOSE, Omari Crawford M.D.    lidocaine (Xylocaine) 1 % injection 2 mL, 2 mL, Tracheal Tube, Q30 MIN PRN, Omari Crawford M.D.    fentaNYL (Sublimaze) injection 50 mcg, 50 mcg, Intravenous, Q15 MIN PRN **AND** fentaNYL (Sublimaze) injection 100 mcg, 100 mcg, Intravenous, Q15 MIN PRN **AND** fentaNYL (SUBLIMAZE) 50 mcg/mL in 50mL (Continuous Infusion), , Intravenous, Continuous, Dose not Required at 10/29/23 1800 **AND** [DISCONTINUED] dexmedetomidine (PRECEDEX) 400 mcg/100mL NS premix infusion, 0-0.7 mcg/kg/hr (Ideal), Intravenous, Continuous, Omari Crawford M.D.    insulin regular (HumuLIN R,NovoLIN R) injection, 1-6 Units, Subcutaneous, Q6HRS **AND** POC blood glucose manual result, , , Q6H **AND** NOTIFY MD and PharmD, , , Once **AND** Administer 20 grams of glucose (approximately 8 ounces of fruit juice) every 15 minutes PRN FSBG less than 70 mg/dL, , , PRN **AND** dextrose 10 % BOLUS 25 g, 25 g,  Intravenous, Q15 MIN PRN, Omari Crawford M.D.    ipratropium-albuterol (DUONEB) nebulizer solution, 3 mL, Nebulization, Q2HRS PRN (RT), Omari Crawford M.D.    labetalol (Normodyne/Trandate) injection 10 mg, 10 mg, Intravenous, Q10 MIN PRN, Sohan Thakur M.D.    hydrALAZINE (Apresoline) injection 10 mg, 10 mg, Intravenous, Q2HRS PRN, Sohan Thakur M.D.    niCARdipine (Cardene) 25 mg in  mL Standard Infusion, 0-15 mg/hr, Intravenous, Continuous, Sohan Thakur M.D., Dose not Required at 10/29/23 1815    propofol (DIPRIVAN) injection, 0-80 mcg/kg/min (Ideal), Intravenous, Continuous, Paused at 10/29/23 1818 **AND** [START ON 10/31/2023] Triglycerides Starting now and then Every 3 Days, , , Every 3 Days (0300), Omari Crawford M.D.    Pharmacy Consult: Enteral tube insertion - review meds/change route/product selection, 1 Each, Other, PHARMACY TO DOSE, Omari Crawford M.D.    atorvastatin (Lipitor) tablet 40 mg, 40 mg, Enteral Tube, Q EVENING, Omari Crawford M.D., 40 mg at 10/29/23 2055    senna-docusate (Pericolace Or Senokot S) 8.6-50 MG per tablet 2 Tablet, 2 Tablet, Enteral Tube, BID, 2 Tablet at 10/30/23 0530 **AND** polyethylene glycol/lytes (Miralax) PACKET 1 Packet, 1 Packet, Enteral Tube, QDAY PRN **AND** magnesium hydroxide (Milk Of Magnesia) suspension 30 mL, 30 mL, Enteral Tube, QDAY PRN **AND** bisacodyl (Dulcolax) suppository 10 mg, 10 mg, Rectal, QDAY PRN, Omari Crawford M.D.    norepinephrine (Levophed) 8 mg in 250 mL NS infusion (premix), 0-1 mcg/kg/min (Ideal), Intravenous, Continuous, Omari Crawford M.D., Last Rate: 7.5 mL/hr at 10/29/23 1952, 0.05 mcg/kg/min at 10/29/23 1952    Physical Examination:     Vitals:    10/30/23 0645 10/30/23 0651 10/30/23 0700 10/30/23 0800   BP: 117/59 117/59 121/60 131/64   Pulse: (!) 58 (!) 57 (!) 54 (!) 57   Resp: 18 18 (!) 23 16   Temp:       TempSrc:       SpO2: 98% 99% 99% 100%   Weight:       Height:                NEUROLOGICAL EXAM:     Patient is intubated off sedation.  Not interactive.  Minimal response to noxious stimuli.  Does not open his eyes spontaneously.  Slight right gaze preference.  Pupils are equal but minimally reactive.  Positive gag on suctioning.  Positive corneal reflex.  Vestibular response intact.  No movement on the right side noxious stimuli.  Slight withdrawal to noxious stimuli on the left side.  Reflexes are mute.  No obvious signs ataxia.    Objective Data:    Labs:  Lab Results   Component Value Date/Time    PROTHROMBTM 15.4 (H) 10/29/2023 03:06 PM    INR 1.21 (H) 10/29/2023 03:06 PM      Lab Results   Component Value Date/Time    WBC 11.8 (H) 10/30/2023 04:58 AM    RBC 4.42 (L) 10/30/2023 04:58 AM    HEMOGLOBIN 13.5 (L) 10/30/2023 04:58 AM    HEMATOCRIT 41.3 (L) 10/30/2023 04:58 AM    MCV 93.4 10/30/2023 04:58 AM    MCH 30.5 10/30/2023 04:58 AM    MCHC 32.7 10/30/2023 04:58 AM    MPV 10.2 10/30/2023 04:58 AM    NEUTSPOLYS 82.50 (H) 10/30/2023 04:58 AM    LYMPHOCYTES 7.60 (L) 10/30/2023 04:58 AM    MONOCYTES 9.10 10/30/2023 04:58 AM    EOSINOPHILS 0.10 10/30/2023 04:58 AM    BASOPHILS 0.30 10/30/2023 04:58 AM      Lab Results   Component Value Date/Time    SODIUM 135 10/30/2023 04:58 AM    POTASSIUM 4.1 10/30/2023 04:58 AM    CHLORIDE 103 10/30/2023 04:58 AM    CO2 20 10/30/2023 04:58 AM    GLUCOSE 133 (H) 10/30/2023 04:58 AM    BUN 13 10/30/2023 04:58 AM    CREATININE 0.74 10/30/2023 04:58 AM      Lab Results   Component Value Date/Time    CHOLSTRLTOT 105 10/30/2023 04:58 AM    LDL 47 10/30/2023 04:58 AM    HDL 41 10/30/2023 04:58 AM    TRIGLYCERIDE 86 10/30/2023 04:58 AM       Lab Results   Component Value Date/Time    ALKPHOSPHAT 86 10/30/2023 04:58 AM    ASTSGOT 34 10/30/2023 04:58 AM    ALTSGPT 27 10/30/2023 04:58 AM    TBILIRUBIN 0.5 10/30/2023 04:58 AM        Imaging/Testing:  DX-CHEST-PORTABLE (1 VIEW)   Final Result         1.  Hazy left pulmonary opacity suggests subtle  infiltrates.   2.  Atherosclerosis      DX-ABDOMEN FOR TUBE PLACEMENT   Final Result      Feeding tube tip projects in the second portion of duodenum.      Nasogastric tube tip is in the distal stomach or first portion of the duodenum.      DX-CHEST-LIMITED (1 VIEW)   Final Result         Intubated. No airspace consolidation identified.      CT-CEREBRAL PERFUSION ANALYSIS   Final Result      1.  Cerebral blood flow less than 30% likely representing completed infarct = 15 mL.      2.  T Max more than 6 seconds likely representing combination of completed infarct and ischemia = 126 mL.      3.  Mismatched volume likely representing ischemic brain/penumbra = 111      4.  Please note that the cerebral perfusion was performed on the limited brain tissue around the basal ganglia region. Infarct/ischemia outside the CT perfusion sections can be missed in this study.      EC-ECHOCARDIOGRAM COMPLETE W/O CONT    (Results Pending)   IR-THROMBO MECHANICAL ARTERY,INIT    (Results Pending)   CT-HEAD W/O    (Results Pending)   MR-BRAIN-W/O    (Results Pending)   DX-CHEST-PORTABLE (1 VIEW)    (Results Pending)   DX-PELVIS-1 OR 2 VIEWS    (Results Pending)         Assessment and Plan:    78-year-old male with metastatic lung cancer.  Was admitted at outside facility.  Due to PEs and small infarcts.  Was on Xarelto that was changed to Lovenox then was changed to Eliquis.  Sometime yesterday morning or the night before patient had a sudden change in neuro status.  Found to have bilateral MCA occlusions.  Was transferred here for thrombectomy.  Thrombectomy was successful with bilateral TICI score 2B.  Of the right distal M1 occlusion and the left M1 thrombus.  Patient now is intubated.  Minimally interactive.  We will get imaging today to assess stroke burden.  But given the overall situation with cancer causing most likely a severe hypercoagulable state that is refractory to anticoagulation prognosis is poor.    Plan:  1.  Recommend  imaging either CT or MRI today to assess stroke burden.  2.  Blood pressure parameters between 120s to 160s systolic.  3.  We will hold off on anticoagulation timing until we get repeat imaging.  4.  Palliative care consultation get clarification goals of care    Spent over 54 minutes reviewing scans including those the right outside facility notes and going over the care plan with the primary physician.    This chart was partially generated using voice recognition technology and sound alike word replacement may be present, best efforts were made to make the chart accurate.    Omari Nguyen MD  Board Certified Neurology, ABPN  (t) [phone]

## 2023-10-30 NOTE — DIETARY
"Nutrition Support Assessment:  Day 1 of admit.  Mateusz Luciano is a 78 y.o. male with admitting DX of Acute ischemic stroke.      Current problem list:  Acute ischemic stroke  PNA  Acute respiratory failure with hypoxia  Metastatic adenocarcinoma  VTE     Assessment:  Estimated Nutritional Needs based on:   Height: 185.4 cm (6' 0.99\")  Weight: 83.9 kg (184 lb 15.5 oz) - bed scale  Weight to Use in Calculations: 83.9 kg (184 lb 15.5 oz)  Body mass index is 24.41 kg/m²., BMI classification: normal  IBW: 184 lbs (83.6 kg)    Calculation/Equation: REE per MSJ x 1.15 - 1.3 = 1862 - 2105  RMR per PSU (VE: 8.6 L/min and Tmax: 37.9 C) = 1935 kcals  Total Calories / day: 1850 - 2150  (Calories / k - 26)  Total Grams Protein / day: 125 - 167  (Grams Protein / k.5 - 2.0 of IBW)     Evaluation:   Consult received for TF.   IRIS feeding tube placed and confirmed in second portion of duodenum.   Pt is intubated on ventilator support.   Per rounds, noted pt has cortrak (from [facility]) and OG tube in place. Plan for MRI and CT, family is coming in and palliative consult is plan. Pt is obtunded with multiple infarcts, hx of metastatic lung cancer, propofol is off and pt is not following, only responds to painful stimuli. 70 cc UOP this am.   Skin: No wounds or edema noted.   Labs: glucose 133, PAB 14.7, A1C 6.2  Meds: pepcid, SSI, mag sulfate, senna, bowel protocol, Plasmalyte-A IV, Levo at 0.05 mcg/kg/min  Last BM: PTA  Impact Peptide 1.5 hydrolyzed formula is indicated to meet needs per [employer] oncology guidelines and while on pressor support.      Malnutrition Risk: Pt appears adequately nourished.      Recommendations/Plan:  Start TF Impact Peptide 1.5 at 25 ml/hr and advance per protocol to goal rate of 60 ml/hr to provide 2160 kcals, 135 gm protein, and 1109 ml free water per day.   Fluids per MD.   Monitor Levo infusion rate.   Monitor weight trend, labs, meds.     RD following.             "

## 2023-10-31 NOTE — DIETARY
Nutrition Services Brief Update:    Problem: Nutritional:  Goal: Nutrition support tolerated and meeting greater than 85% of estimated needs  Outcome: MET    Pt is receiving TF Impact Peptide 1.5 at goal rate 60 ml/hr. Pt remains intubated, Phos 1.5 and is on KPhos. A1C 6.2. Levo is running at 0.1 mcg/kg/min and propofol has been paused.     RD following.

## 2023-10-31 NOTE — PROGRESS NOTES
Neurology Progress Note  Neurohospitalist Service, [facility]    Referring Physician: KISHORE Sheikh*    Chief Complaint   Patient presents with    Possible Stroke       HPI: Refer to initial documented Neurology H&P, as detailed in the patient's chart.    Interval History 10/31: No significant events overnight.        Medications:    Current Facility-Administered Medications:     potassium phosphate IVPB 30 mmol in 500 mL D5W (premix), 30 mmol, Intravenous, Once, Last Rate: 83.3 mL/hr at 10/31/23 0829, 30 mmol at 10/31/23 0829 **FOLLOWED BY** potassium phosphate 15 mmol in  mL ivpb, 15 mmol, Intravenous, Once, Sarina Ramírez M.D.    electrolyte-A (Plasmalyte-A) infusion, , Intravenous, Continuous, Alyssa Mccormack A.P.R.NKathy, Last Rate: 75 mL/hr at 10/31/23 0433, Rate Verify at 10/31/23 0433    Respiratory Therapy Consult, , Nebulization, Continuous RT, Omari Crawford M.D.    famotidine (Pepcid) tablet 20 mg, 20 mg, Enteral Tube, Q12HRS, 20 mg at 10/31/23 0514 **OR** famotidine (Pepcid) injection 20 mg, 20 mg, Intravenous, Q12HRS, Omari Crawford M.D.    MD Alert...ICU Electrolyte Replacement per Pharmacy, , Other, PHARMACY TO DOSE, Omari Crawford M.D.    lidocaine (Xylocaine) 1 % injection 2 mL, 2 mL, Tracheal Tube, Q30 MIN PRN, Omari Crawford M.D.    fentaNYL (Sublimaze) injection 50 mcg, 50 mcg, Intravenous, Q15 MIN PRN **AND** fentaNYL (Sublimaze) injection 100 mcg, 100 mcg, Intravenous, Q15 MIN PRN **AND** fentaNYL (SUBLIMAZE) 50 mcg/mL in 50mL (Continuous Infusion), , Intravenous, Continuous, Dose not Required at 10/29/23 1800 **AND** [DISCONTINUED] dexmedetomidine (PRECEDEX) 400 mcg/100mL NS premix infusion, 0-0.7 mcg/kg/hr (Ideal), Intravenous, Continuous, Omari Crawford M.D.    insulin regular (HumuLIN R,NovoLIN R) injection, 1-6 Units, Subcutaneous, Q6HRS **AND** POC blood glucose manual result, , , Q6H **AND** NOTIFY MD and PharmD, , , Once **AND**  Administer 20 grams of glucose (approximately 8 ounces of fruit juice) every 15 minutes PRN FSBG less than 70 mg/dL, , , PRN **AND** dextrose 10 % BOLUS 25 g, 25 g, Intravenous, Q15 MIN PRN, Omari Crawford M.D.    ipratropium-albuterol (DUONEB) nebulizer solution, 3 mL, Nebulization, Q2HRS PRN (RT), Omari Crawford M.D.    labetalol (Normodyne/Trandate) injection 10 mg, 10 mg, Intravenous, Q10 MIN PRN, Sohan Thakur M.D.    hydrALAZINE (Apresoline) injection 10 mg, 10 mg, Intravenous, Q2HRS PRN, Sohan Thakur M.D.    niCARdipine (Cardene) 25 mg in  mL Standard Infusion, 0-15 mg/hr, Intravenous, Continuous, Sohan Thakur M.D., Dose not Required at 10/29/23 1815    propofol (DIPRIVAN) injection, 0-80 mcg/kg/min (Ideal), Intravenous, Continuous, Paused at 10/29/23 1818 **AND** Triglycerides Starting now and then Every 3 Days, , , Every 3 Days (0300), Omari Crawford M.D.    Pharmacy Consult: Enteral tube insertion - review meds/change route/product selection, 1 Each, Other, PHARMACY TO DOSE, Omari Crawford M.D.    atorvastatin (Lipitor) tablet 40 mg, 40 mg, Enteral Tube, Q EVENING, Omari Crawford M.D., 40 mg at 10/30/23 1821    senna-docusate (Pericolace Or Senokot S) 8.6-50 MG per tablet 2 Tablet, 2 Tablet, Enteral Tube, BID, 2 Tablet at 10/31/23 0514 **AND** polyethylene glycol/lytes (Miralax) PACKET 1 Packet, 1 Packet, Enteral Tube, QDAY PRN **AND** magnesium hydroxide (Milk Of Magnesia) suspension 30 mL, 30 mL, Enteral Tube, QDAY PRN **AND** bisacodyl (Dulcolax) suppository 10 mg, 10 mg, Rectal, QDAY PRN, Omari Crawford M.D.    norepinephrine (Levophed) 8 mg in 250 mL NS infusion (premix), 0-1 mcg/kg/min (Ideal), Intravenous, Continuous, Omari Crawford M.D., Last Rate: 15 mL/hr at 10/31/23 0615, 0.1 mcg/kg/min at 10/31/23 0615    Physical Examination:     Vitals:    10/31/23 0400 10/31/23 0500 10/31/23 0600 10/31/23 0757   BP: 120/58 120/63 116/60    Pulse: (!) 53 (!) 58 (!)  "57    Resp: 17 (!) 21 16    Temp:       TempSrc:       SpO2: 96% 97% 98%    Weight:       Height:    1.854 m (6' 0.99\")           NEUROLOGICAL EXAM:     Patient is intubated off sedation.  Not interactive.  Minimal response to noxious stimuli.  Does not open his eyes spontaneously.  No gaze preference.  Pupils are equal but minimally reactive.  Positive gag on suctioning.  Positive corneal reflex.  Vestibular response intact.  Slight movement to noxious stimuli in all 4.    Reflexes are mute.  No obvious signs ataxia.    No segment changes compared to yesterday.    Objective Data:    Labs:  Lab Results   Component Value Date/Time    PROTHROMBTM 15.4 (H) 10/29/2023 03:06 PM    INR 1.21 (H) 10/29/2023 03:06 PM      Lab Results   Component Value Date/Time    WBC 11.3 (H) 10/31/2023 04:14 AM    RBC 3.84 (L) 10/31/2023 04:14 AM    HEMOGLOBIN 11.9 (L) 10/31/2023 04:14 AM    HEMATOCRIT 35.9 (L) 10/31/2023 04:14 AM    MCV 93.5 10/31/2023 04:14 AM    MCH 31.0 10/31/2023 04:14 AM    MCHC 33.1 10/31/2023 04:14 AM    MPV 10.9 10/31/2023 04:14 AM    NEUTSPOLYS 80.00 (H) 10/31/2023 04:14 AM    LYMPHOCYTES 8.80 (L) 10/31/2023 04:14 AM    MONOCYTES 9.10 10/31/2023 04:14 AM    EOSINOPHILS 1.10 10/31/2023 04:14 AM    BASOPHILS 0.60 10/31/2023 04:14 AM      Lab Results   Component Value Date/Time    SODIUM 135 10/31/2023 04:14 AM    POTASSIUM 3.6 10/31/2023 04:14 AM    CHLORIDE 102 10/31/2023 04:14 AM    CO2 23 10/31/2023 04:14 AM    GLUCOSE 143 (H) 10/31/2023 04:14 AM    BUN 17 10/31/2023 04:14 AM    CREATININE 0.54 10/31/2023 04:14 AM    GLOMRATE 104 10/29/2023 02:27 AM      Lab Results   Component Value Date/Time    CHOLSTRLTOT 105 10/30/2023 04:58 AM    LDL 47 10/30/2023 04:58 AM    HDL 41 10/30/2023 04:58 AM    TRIGLYCERIDE 76 10/31/2023 04:14 AM       Lab Results   Component Value Date/Time    ALKPHOSPHAT 76 10/31/2023 04:14 AM    ASTSGOT 24 10/31/2023 04:14 AM    ALTSGPT 21 10/31/2023 04:14 AM    TBILIRUBIN 0.4 10/31/2023 04:14 " AM        Imaging/Testing:  DX-CHEST-PORTABLE (1 VIEW)   Final Result         1.  Scattered hazy bilateral pulmonary infiltrates, greater on left, slightly decreased since prior study.   2.  Atherosclerosis      MR-BRAIN-W/O   Final Result         Multifocal bilateral cerebral hemispheric and cerebellar acute infarcts.      Moderate-sized confluent area of infarction noted in the right posterior frontal and anterior parietal region in the right MCA territory with minimal hemorrhage within but without mass effect or midline shift.      Confluent area of acute infarction involving the left caudate, basal ganglia and adjacent corona radiata with minimal hemorrhage within but without mass effect or midline shift.      DX-CHEST-PORTABLE (1 VIEW)   Final Result      1.  Medial left base airspace disease.   2.  2 metallic buttons project at the left chest. No other metallic foreign body seen.      EC-ECHOCARDIOGRAM COMPLETE W/ CONT   Final Result      DX-CHEST-PORTABLE (1 VIEW)   Final Result         1.  Hazy left pulmonary opacity suggests subtle infiltrates.   2.  Atherosclerosis      DX-ABDOMEN FOR TUBE PLACEMENT   Final Result      Feeding tube tip projects in the second portion of duodenum.      Nasogastric tube tip is in the distal stomach or first portion of the duodenum.      IR-THROMBO MECHANICAL ARTERY,INIT   Final Result      1.  Occluded LEFT M1 segment. Successful mechanical thrombectomy.TICI 2b flow.   2.  Occluded RIGHT M1 M2 junction. Successful mechanical thrombectomy.TICI 2b flow      DX-CHEST-LIMITED (1 VIEW)   Final Result         Intubated. No airspace consolidation identified.      CT-CEREBRAL PERFUSION ANALYSIS   Final Result      1.  Cerebral blood flow less than 30% likely representing completed infarct = 15 mL.      2.  T Max more than 6 seconds likely representing combination of completed infarct and ischemia = 126 mL.      3.  Mismatched volume likely representing ischemic brain/penumbra = 111       4.  Please note that the cerebral perfusion was performed on the limited brain tissue around the basal ganglia region. Infarct/ischemia outside the CT perfusion sections can be missed in this study.            Assessment and Plan:    78-year-old male with metastatic lung cancer.  Was admitted at outside facility.  Due to PEs and small infarcts.  Was on Xarelto that was changed to Lovenox then was changed to Eliquis.  Sometime yesterday morning or the night before patient had a sudden change in neuro status.  Found to have bilateral MCA occlusions.  Was transferred here for thrombectomy.  Thrombectomy was successful with bilateral TICI score 2B.  Of the right distal M1 occlusion and the left M1 thrombus.  Patient now is intubated.  Minimally interactive.  We will get imaging today to assess stroke burden.  But given the overall situation with cancer causing most likely a severe hypercoagulable state that is refractory to anticoagulation prognosis is poor.      Update 10/31  No significant change in examination today.  Patient remains off sedation.  We will continue to monitor for the next 1 to 2 days.  Had a meeting yesterday to family including patient's wife at bedside.  Explained the poor prognosis.  Given the stroke burden with the underlying metastatic cancer.    Plan:  1.  Continue supportive therapies  2.  Blood pressure parameters between 120s to 160s systolic.  3.  No anticoagulation given stroke burden with hemorrhagic conversion.  4.  Palliative care consultation get clarification goals of care        This chart was partially generated using voice recognition technology and sound alike word replacement may be present, best efforts were made to make the chart accurate.    Omari Nguyen MD  Board Certified Neurology, ABPN  (t) [phone]

## 2023-10-31 NOTE — CARE PLAN
Problem: Ventilation  Goal: Ability to achieve and maintain unassisted ventilation or tolerate decreased levels of ventilator support  Description: Target End Date:  4 days     Document on Vent flowsheet    1.  Support and monitor invasive and noninvasive mechanical ventilation  2.  Monitor ventilator weaning response  3.  Perform ventilator associated pneumonia prevention interventions  4.  Manage ventilation therapy by monitoring diagnostic test results  Outcome: Progressing     Ventilator Daily Summary     Vent Day # 3     Airway: 8@26     Ventilator settings: 16, 480, +8, 30%

## 2023-10-31 NOTE — CARE PLAN
Problem: Ventilation  Goal: Ability to achieve and maintain unassisted ventilation or tolerate decreased levels of ventilator support  Description: Target End Date:  4 days     Document on Vent flowsheet    1.  Support and monitor invasive and noninvasive mechanical ventilation  2.  Monitor ventilator weaning response  3.  Perform ventilator associated pneumonia prevention interventions  4.  Manage ventilation therapy by monitoring diagnostic test results  Outcome: Progressing     Ventilator Daily Summary    Vent Day #3    Airway: 8.0 @26    Ventilator settings:   CMV R 16 480 8 30   Weaning trials:  None   Respiratory Procedures:    Plan: Continue current ventilator settings and wean mechanical ventilation as tolerated per physician orders.

## 2023-10-31 NOTE — DOCUMENTATION QUERY
On license of [facility]                                                                       Query Response Note      PATIENT:               ИРИНА HENRY  ACCT #:                  [id number]  MRN:                     [id number]  :                      1945  ADMIT DATE:       10/29/2023 3:03 PM  DISCH DATE:          RESPONDING  PROVIDER #:        [id number]           QUERY TEXT:    There is conflicting documentation in the 10/29 critical care consult and subsequent PN's.  Both metastatic adenocarcinoma of the lung and  diagnosis metastatic adenocarcinoma - felt to be lung as primary.  Based on the clinical indicators documented, can this documentation be further clarified?      The patient's Clinical Indicators include:  10/29 CXR - No airspace consolidation identified  10/30 CXR x2 - Hazy left pulmonary opacity suggests subtle infiltrates; Medial left base airspace disease    10/29 Critical care consult ([place]) - history of metastatic adenocarcinoma of the lung...Dx Metastatic adenocarcinoma - Felt to be lung as primary; supraclavicular node biopsied at [facility] 10/24 and + for adenocarcinoma    Treatment - further evaluation when critical illness resolves    Risk factors - supraclavicular node biopsied at [facility] 10/24 and + for adenocarcinoma    Thank you,  Babs Landry BSN  Clinical   Connect via [email or web address]  Options provided:   -- Primary lung cancer   -- Metastatic lung cancer   -- Other explanation, (please specify the other explanation)   -- Unable to determine      Query created by: Babs Landry on 10/31/2023 10:23 AM    RESPONSE TEXT:    Primary lung cancer          Electronically signed by:  DIMITRIS ADKINS MD 10/31/2023 2:35 PM

## 2023-10-31 NOTE — DOCUMENTATION QUERY
[facility]                                                                       Query Response Note      PATIENT:               ИРИНА HENRY  ACCT #:                  [id number]  MRN:                     [id number]  :                      1945  ADMIT DATE:       10/29/2023 3:03 PM  DISCH DATE:          RESPONDING  PROVIDER #:        [id number]           QUERY TEXT:    Patient admitted for acute ischemic stroke and also diagnosed with presumed PNA, acute respiratory failure with hypoxia.  There is conflicting documentation regarding if patient was placed on mechanical ventilation for airway protection or acute respiratory failure.  Based on the clinical indicators documented, please provide further clarification.    The patient's Clinical Indicators include:  Admit vitals: WNL -> 20 minutes later RR 24 and remained elevated through 10/30  Admit labs: ABG Ph 7.429, PCo2 30.5, Po2 87    ED note -  had respiratory failure, unable to protect airway requiring endotracheal intubation...ventilary support  10/29 Critical care consult - Appearance: Mild tachypnea, audible upper airway rattling... intubated by ERP for airway protection in the setting of acute ischemic stroke...Dx Acute respiratory failure with hypoxia    Treatment - Mechanical ventilation    Risk factors - Acute ischemic stroke, presumed PNA, Acute respiratory failure with hypoxia    Thank you,  Babs Landry BSN  Clinical   Connect via [employer]  Options provided:   -- Intubated/ventilated for airway protection due to stroke   -- Intubated/ventilated acute respiratory failure with hypoxia   -- Other explanation, (please specify the other explanation)   -- Unable to determine      Query created by: Babs Landry on 10/31/2023 10:23 AM    RESPONSE TEXT:    Intubated/ventilated for airway protection due to stroke       QUERY TEXT:    Based on the  clinical indicators documented, please clarify the status of pneumonia.              The patient's Clinical Indicators include:  Admit labs: WBC 10.2; Procalcitonin <0.05    10/29 CXR - No airspace consolidation identified  10/30 CXR x2 - Hazy left pulmonary opacity suggests subtle infiltrates; Medial left base airspace disease    10/29 PN (Crawford) - Dx Pneumonia - Jay Olivares was treating for presumed PNA...check PCT...Empiric C3 + AZT; will d/c if PCT negative    Treatment:  10/29 - IV Azithromycin, Ceftriaxone x1; RT consult  10/30 - IV Azithromycin, Ceftriaxone x1    Risk factors - being treated at [facility] for Pneumonia    Thank you,  Babs Landry BSN  Clinical   Connect via [employer]  Options provided:   -- Condition of pneumonia exists, please document additional clinical indicators   -- Condition of pneumonia does not exist and amended documentation provided in the medical record   -- Other explanation, (please specify the other explanation)   -- Unable to provide additional clarity regarding the diagnosis      Query created by: Babs Landry on 10/31/2023 10:23 AM    RESPONSE TEXT:    Other explanation- stroke , no pna has lung cancer          Electronically signed by:  DIMITRIS ADKINS MD 10/31/2023 11:54 AM

## 2023-10-31 NOTE — CONSULTS
MRN: [id number]  Date of palliative consult: 10/31/23  Reason for consult: ACP  Referring provider: Dr. Crawford & Alyssa TAYLOR  Location of consult: [facility] ICU  Additional consulting services: Critical care, neurology    HPI:   Mateusz Luciano is a 78 y.o. male with past medical history of recently diagnosed metastatic lung adenocarcinoma transferred from [facility] 10/29/2023 for bilateral MCA occlusive strokes after presenting there several days earlier with aphagia and found to have PE/DVT and small infarcts. He was started on ACT with Xarelto which was changed to Lovenox then Eliquis. While at [facility], neuro exam worsened with altered mental status and development of left sided hemiparesis.  CTA revealed right ICA thrombus and left MCA occlusion.  Patient was transferred to [facility].  Status post thrombectomy of both left M1 thrombus and right M1/M2 junction thrombus in IR on 10/29/2023.  Poor neuro exam off sedation    ROS:    Review of Systems   Unable to perform ROS: Medical condition     PE:   Recent vital signs  BMI: Body mass index is 25.86 kg/m².    No data recorded.  Monitored Temp: 37.6 °C (99.7 °F)  Pulse  Av.8  Min: 48  Max: 152   Blood Pressure : 116/60       Physical Exam  Constitutional:       Interventions: He is intubated.   Pulmonary:      Effort: He is intubated.   Neurological:      Mental Status: He is unresponsive.      Comments: Off sedation     s  ASSESSMENT/PLAN WITH SHARED DECISION MAKING:   PHYSICAL ASPECTS OF CARE  Palliative Performance Scale: 10%; poor prognosis    #Metastatic lung adenocarcinoma  #Acute bilateral MCA stroke status post thrombectomy  #Acute hypoxic respiratory failure  #DVT and PE    SOCIAL ASPECTS OF CARE  Patient is . He and his wife Keaton reside in [facility].    SPIRITUAL ASPECTS OF CARE   Zoroastrianism.  Offered spiritual care visit to family.  Encouraged him to discuss with nursing staff or myself if they wish to have a  " visit.    GOALS OF CARE/SERIOUS ILLNESS CONVERSATION  No family at bedside.  Records reviewed.  Appreciate updates from bedside nurse regarding family meeting yesterday with neurology and intensivist CLAUDIA.  Phone call to patient's wife Twila.  Introduced myself and identified phone call as non urgent.  Discussed role of palliative care/reason for consultation.  Twila asked if patient was awake.  I confirmed he is unresponsive off sedation.  Twila reports she spoke with neurology yesterday and they are giving patient a few days to see if he wakes up \"is only the second day.\"  She reports she knows what to expect but is also hoping patient will wake up.  She became extremely distraught and asked that we stop discussing patient care and she handed me to her niece Nieves.  Reiterated role of palliative care/reason for consult.  Provided palliative care contact information.  Patient's niece confirmed they will be arriving to the hospital in about an hour.  Nieves confirms that Twila feels that will help patient.  I offered spiritual care visit and encouraged him to speak with nursing and that I will try to swing back by when they are there.  Palliative care contact information left at bedside and encouraged patient's family to reach out with any questions or needs.    Code Status: Full    ACP Documents: None    I spent a total of 35 minutes reviewing medical records, direct face-to-face time with the patient and/or family, documentation and coordination of care. This is separate from the time spent on advance care planning, which is documented above.    Ct \"Zelda\" CLAUDIA Pizarro, [facility]  Palliative Care Nurse Practitioner  [phone]      "

## 2023-10-31 NOTE — THERAPY
Occupational Therapy   Initial Evaluation     Patient Name: Mateusz Luciano  Age:  78 y.o., Sex:  male  Medical Record #: [id number]  Today's Date: 10/31/2023     Precautions  Precautions: (P) Fall Risk, Nasogastric Tube    Assessment    Patient is 78 y.o. male admitted with with altered mental status and development of left sided hemiparesis.  CTA revealed right ICA thrombus and left MCA occlusion now s/p thrombectomy of both left M1 thrombus and right M1/M2 junction thrombus in IR (TICI 2B). Patient unable to provide PLOF due to mentation and current intubated status, per EMR pt was independent prior with ADLs and functional mobility, no AD used, driving. Pt required total assist for bed mobility and attempted ADLs, pt with spontaneous movement of RUE limited movement of LUE. Will continue to see for skilled therapy while admitted as well as recommend post-acute placement.     Plan    Occupational Therapy Initial Treatment Plan   Treatment Interventions: (P) Self Care / Activities of Daily Living, Adaptive Equipment, Neuro Re-Education / Balance, Therapeutic Exercises, Therapeutic Activity  Treatment Frequency: (P) 3 Times per Week  Duration: (P) Until Therapy Goals Met    DC Equipment Recommendations: (P) Unable to determine at this time  Discharge Recommendations: (P) Recommend post-acute placement for additional occupational therapy services prior to discharge home     Objective       10/31/23 0940   Prior Living Situation   Prior Services Home-Independent   Housing / Facility 1 Story House   Equipment Owned None   Lives with - Patient's Self Care Capacity Spouse   Comments per EMR review   Prior Level of ADL Function   Self Feeding Independent   Grooming / Hygiene Independent   Bathing Independent   Dressing Independent   Toileting Independent   Comments per EMR review   Prior Level of IADL Function   Medication Management Independent   Laundry Independent   Kitchen Mobility Independent   Finances Independent    Home Management Independent   Shopping Independent   Prior Level Of Mobility Independent Without Device in Community   Driving / Transportation Driving Independent   Occupation (Pre-Hospital Vocational) Retired Due To Age   Comments per EMR review   Precautions   Precautions Fall Risk;Nasogastric Tube   Cognition    Cognition / Consciousness X   Speech/ Communication Unable to Communicate   Level of Consciousness Unresponsive   Attention Impaired   Initiation Impaired   Active ROM Upper Body   Active ROM Upper Body  X   Comments minimal active movement of LUE, spontaneous RUE   Strength Upper Body   Comments minimal active movement of LUE, spontaneous RUE   Sensation Upper Body   Upper Extremity Sensation  Not Tested   Neurological Concerns   Neurological Concerns Yes   Sitting Posture During ADL's Posterior Lean;Lateral Lean Left   Balance Assessment   Sitting Balance (Static) Dependent   Sitting Balance (Dynamic) Dependent   Weight Shift Sitting Absent   Bed Mobility    Supine to Sit Total Assist   Sit to Supine Total Assist   Scooting Total Assist   Rolling Total Assist to Rt.;Total Assist to Lt.   ADL Assessment   Eating Total Assist   Upper Body Dressing Total Assist   Lower Body Dressing Total Assist   Toileting Total Assist   How much help from another person does the patient currently need...   Putting on and taking off regular lower body clothing? 1   Bathing (including washing, rinsing, and drying)? 1   Toileting, which includes using a toilet, bedpan, or urinal? 1   Putting on and taking off regular upper body clothing? 1   Taking care of personal grooming such as brushing teeth? 1   Eating meals? 1   6 Clicks Daily Activity Score 6   mRS Prior to admission   Prior to admission mRS 0   Modified Tyler (mRS)   Modified Tyler Score 5   Functional Mobility   Sit to Stand Unable to Participate   Mobility EOB ONLY   Activity Tolerance   Sitting Edge of Bed 8 min   Short Term Goals   Short Term Goal # 1 Pt  will complete seated G/H with modA   Short Term Goal # 2 Pt will complete ADL transfers with modA   Short Term Goal # 3 Pt will incorporate BUE into ADLs without verbal cueing   Education Group   Education Provided Role of Occupational Therapist   Role of Occupational Therapist Patient Response Patient;Nonacceptance;Explanation;Reinforcement Needed;No Learning Evidence   Occupational Therapy Initial Treatment Plan    Treatment Interventions Self Care / Activities of Daily Living;Adaptive Equipment;Neuro Re-Education / Balance;Therapeutic Exercises;Therapeutic Activity   Treatment Frequency 3 Times per Week   Duration Until Therapy Goals Met   Problem List   Problem List Decreased Active Daily Living Skills;Decreased Homemaking Skills;Decreased Upper Extremity Strength Right;Decreased Upper Extremity Strength Left;Decreased Upper Extremity AROM Right;Decreased Upper Extremity AROM Left;Decreased Functional Mobility;Decreased Activity Tolerance;Impaired Postural Control / Balance;Safety Awareness Deficits / Cognition   Anticipated Discharge Equipment and Recommendations   DC Equipment Recommendations Unable to determine at this time   Discharge Recommendations Recommend post-acute placement for additional occupational therapy services prior to discharge home   Interdisciplinary Plan of Care Collaboration   IDT Collaboration with  Nursing;Physical Therapist   Patient Position at End of Therapy In Bed;Bed Alarm On;Wrist Restraints Applied;Call Light within Reach;Tray Table within Reach;Phone within Reach   Collaboration Comments RN updated

## 2023-10-31 NOTE — DISCHARGE PLANNING
Vented day 3. TCC will no longer follow.  Please reach out to myself once liberated from the vent & TX evals are present or with any questions.

## 2023-10-31 NOTE — THERAPY
Physical Therapy   Initial Evaluation     Patient Name: Mateusz Luciano  Age:  78 y.o., Sex:  male  Medical Record #: [id number]  Today's Date: 10/31/2023     Precautions  Precautions: Fall Risk;Nasogastric Tube    Assessment  Patient is 78 y.o. male admitted from outside hospital where he was receiving treatment for pulmonary embolisms and small infarcts. He had a change in neuro status and found have bilateral MCA occlusions transferred to [facility] for a thrombectomy. S/P bilateral thrombectomy with TICI 2B. PMH significant for metastatic lung cancer. Pt intubated at time of evaluation. Pt appears to be withdrawing to pain on R UE and LE. Spontaneous movements noted on R LE within full ROM, trace contractions noted in LLE. No command following. Eyes remained shut throughout. PT will trial 3x/wk for two weeks and reassess participation and appropriateness.     Plan    Physical Therapy Initial Treatment Plan   Treatment Plan : Bed Mobility, Neuro Re-Education / Balance, Self Care / Home Evaluation, Therapeutic Activities, Therapeutic Exercise  Treatment Frequency: 3 Times per Week  Duration: Until Therapy Goals Met    DC Equipment Recommendations: Unable to determine at this time  Discharge Recommendations: Recommend post-acute placement for additional physical therapy services prior to discharge home        10/31/23 0938   Vitals   O2 Delivery Device Ventilator   Prior Living Situation   Prior Services Home-Independent   Housing / Facility 1 Story House   Equipment Owned None   Lives with - Patient's Self Care Capacity Spouse   Comments per chart review   Prior Level of Functional Mobility   Comments assume independent   Cognition    Cognition / Consciousness X   Speech/ Communication Intubated / Trached;Unable to Communicate   Level of Consciousness Responds to pain   Ability To Follow Commands Unable to Follow 1 Step Commands   Attention Impaired   Initiation Impaired   Comments pt performing automatic movements like  squeezing R hand with tactile stimuli   Active ROM Lower Body    Active ROM Lower Body  X   Comments R LE AROM spontaneously WDL, L LE AROM limited to trace contractions noted   Strength Lower Body   Lower Body Strength  X   Comments R LE movement noted against gravity in supine, trace contractions noted in LLE   Sensation Lower Body   Lower Extremity Sensation   Not Tested   Neurological Concerns   Neurological Concerns Yes   Sitting Posture During ADL's Lateral Lean Left;Posterior Lean   Coordination Lower Body    Coordination Lower Body  Not Tested   Vision   Vision Comments eyes closed throughout   Balance Assessment   Sitting Balance (Static) Dependent   Sitting Balance (Dynamic) Dependent   Weight Shift Sitting Absent   Bed Mobility    Supine to Sit Total Assist   Sit to Supine Total Assist   Scooting Total Assist   Rolling Total Assist to Rt.;Total Assist to Lt.   Functional Mobility   Sit to Stand Unable to Participate   Mobility EOB only   Edema / Skin Assessment   Edema / Skin  Not Assessed   Patient / Family Goals    Patient / Family Goal #1 none stated   Short Term Goals    Short Term Goal # 1 pt will demonstrate initiation of bed mobility in 6tx in order to progress with therapy   Short Term Goal # 2 pt will be able to sit EOB with support and postural corrections in 6tx in order to progress with therapy   Education Group   Education Provided Role of Physical Therapist   Role of Physical Therapist Patient Response Patient;No Learning Evidence;Explanation   Anticipated Discharge Equipment and Recommendations   DC Equipment Recommendations Unable to determine at this time   Discharge Recommendations Recommend post-acute placement for additional physical therapy services prior to discharge home

## 2023-10-31 NOTE — PROGRESS NOTES
Critical Care Progress Note    Date of admission  10/29/2023    Chief Complaint  78 y.o. male admitted 10/29/2023 with changes in LOC    Hospital Course  Mateusz Luciano is a 78-year-old male with a past medical history of DVT/PE (Xarelto) metastatic adenocarcinoma of the lung hypertension, GERD who was recently hospitalized at [facility] on 10/26/2023 due to PE and multiple territory embolic infarcts.  Apparently he was initially started on Xarelto, changed to Lovenox and then  Eliquis, however he continued to experience embolic events.  On the morning of 10/29 at approximately 0900 he was found unresponsive -LKW 0200.  He underwent CT angio of head and neck which showed a left M1 occlusion therefore he was transferred to [facility] for higher level of care possible IR intervention.  Upon arrival to [facility] he was intubated and transported to IR where he underwent thrombectomies for a left M1 thrombus and right M1/M2 junction thrombus, both resulting in TICI 2B.  He was transferred to ICU for critical care management.   10/30-titrated off of all sedation with little improvement in neuro exam.  -MRI-showed multifocal bilateral cerebral hemispheric and cerebellar acute infarcts; moderate area of infarction in right posterior frontal and anterior parietal region with hemorrhagic conversion; infarction of left caudate basal ganglia and corona radiata with hemorrhagic conversion-discussed with neurology and it was determined to hold anticoagulation, ASA due to stroke burden with hemorrhagic conversion.     Interval Problem Update  Reviewed last 24 hour events:    -Afebrile-epic documentation of fever 104 is incorrect  -MRI-showed multifocal bilateral cerebral hemispheric and cerebellar acute infarcts; moderate area of infarction in right posterior frontal and anterior parietal region with hemorrhagic conversion; infarction of left caudate basal ganglia and corona radiata with hemorrhagic  conversion  -Holding anticoagulation/ASA due to stroke burden and hemorrhagic conversion  -CXR- Hazy infiltrates   -Sedation has been off for a little over 24 hours  -Levophed with active titration to keep -160  -Vent day 3-  -Update family  -Palliative care consulted    Review of Systems  Review of Systems   Unable to perform ROS: Critical illness        Vital Signs for last 24 hours   Pulse:  [48-75] 57  Resp:  [16-37] 16  BP: (113-153)/(55-72) 116/60  SpO2:  [83 %-100 %] 98 %    Hemodynamic parameters for last 24 hours       Respiratory Information for the last 24 hours  Vent Mode: Spont  Rate (breaths/min): 16  Vt Target (mL): 480  PEEP/CPAP: 8  P Support: 5  MAP: 10  Length of Weaning Trial (Hours): 1 hour  Control VTE (exp VT): 469    Physical Exam   Physical Exam  Vitals and nursing note reviewed.   Constitutional:       Interventions: He is intubated.      Comments: No sedation   HENT:      Head: Normocephalic and atraumatic.      Mouth/Throat:      Mouth: Mucous membranes are moist.      Pharynx: Oropharynx is clear.   Cardiovascular:      Rate and Rhythm: Regular rhythm. Bradycardia present.      Pulses:           Radial pulses are 2+ on the right side and 2+ on the left side.      Heart sounds: Heart sounds are distant.      Comments: Pedal pulses palpated however difficult to palpate bilateral lower feet  Pulmonary:      Effort: He is intubated.   Chest:   Breasts:     Breasts are symmetrical.   Abdominal:      Comments: Abdomen soft, round, nondistended sounds present     Genitourinary:     Comments: Trivedi to down drain with clear yellow urine  Musculoskeletal:      Right lower leg: No edema.      Left lower leg: No edema.   Skin:     General: Skin is cool.      Capillary Refill: Capillary refill takes 2 to 3 seconds.      Comments: Cores warm extremities cool   Neurological:      Comments: Intubated and sedated  Slight movement to painful stimuli only  BRAULIO 2mm    Psychiatric:      Comments:  Intubated and unresponsive         Medications  Current Facility-Administered Medications   Medication Dose Route Frequency Provider Last Rate Last Admin    electrolyte-A (Plasmalyte-A) infusion   Intravenous Continuous Alyssa Mccormack A.P.R.N. 75 mL/hr at 10/31/23 0433 Rate Verify at 10/31/23 0433    Respiratory Therapy Consult   Nebulization Continuous RT Omari Crawford M.D.        famotidine (Pepcid) tablet 20 mg  20 mg Enteral Tube Q12HRS Omari Crawford M.D.   20 mg at 10/31/23 0514    Or    famotidine (Pepcid) injection 20 mg  20 mg Intravenous Q12HRS Omari Crawford M.D.        MD Alert...ICU Electrolyte Replacement per Pharmacy   Other PHARMACY TO DOSE Omari Crawford M.D.        lidocaine (Xylocaine) 1 % injection 2 mL  2 mL Tracheal Tube Q30 MIN PRN Omari Crawford M.D.        fentaNYL (Sublimaze) injection 50 mcg  50 mcg Intravenous Q15 MIN PRN Omari Crawford M.D.        And    fentaNYL (Sublimaze) injection 100 mcg  100 mcg Intravenous Q15 MIN PRN Omari Crawford M.D.        And    fentaNYL (SUBLIMAZE) 50 mcg/mL in 50mL (Continuous Infusion)   Intravenous Continuous Omari Crawford M.D.   Dose not Required at 10/29/23 1800    insulin regular (HumuLIN R,NovoLIN R) injection  1-6 Units Subcutaneous Q6HRS Omari Crawford M.D.        And    dextrose 10 % BOLUS 25 g  25 g Intravenous Q15 MIN PRN Omari Crawford M.D.        ipratropium-albuterol (DUONEB) nebulizer solution  3 mL Nebulization Q2HRS PRN (RT) Omari Crawford M.D.        labetalol (Normodyne/Trandate) injection 10 mg  10 mg Intravenous Q10 MIN PRN Sohan Thakur M.D.        hydrALAZINE (Apresoline) injection 10 mg  10 mg Intravenous Q2HRS PRN Sohan Thakur M.D.        niCARdipine (Cardene) 25 mg in  mL Standard Infusion  0-15 mg/hr Intravenous Continuous Sohan Thakur M.D.   Dose not Required at 10/29/23 1815    propofol (DIPRIVAN) injection  0-80 mcg/kg/min (Ideal) Intravenous Continuous Omari Crawford  M.D.   Paused at 10/29/23 1818    Pharmacy Consult: Enteral tube insertion - review meds/change route/product selection  1 Each Other PHARMACY TO DOSE Omari Crawford M.D.        atorvastatin (Lipitor) tablet 40 mg  40 mg Enteral Tube Q EVENING Omari Crawford M.D.   40 mg at 10/30/23 1821    senna-docusate (Pericolace Or Senokot S) 8.6-50 MG per tablet 2 Tablet  2 Tablet Enteral Tube BID Omari Crawford M.D.   2 Tablet at 10/31/23 0514    And    polyethylene glycol/lytes (Miralax) PACKET 1 Packet  1 Packet Enteral Tube QDAY PRN Omari Crawford M.D.        And    magnesium hydroxide (Milk Of Magnesia) suspension 30 mL  30 mL Enteral Tube QDAY PRN Omari Crawford M.D.        And    bisacodyl (Dulcolax) suppository 10 mg  10 mg Rectal QDAY PRN Omari Crawford M.D.        norepinephrine (Levophed) 8 mg in 250 mL NS infusion (premix)  0-1 mcg/kg/min (Ideal) Intravenous Continuous Omari Crawford M.D. 15 mL/hr at 10/31/23 0615 0.1 mcg/kg/min at 10/31/23 0615       Fluids    Intake/Output Summary (Last 24 hours) at 10/31/2023 0704  Last data filed at 10/31/2023 0600  Gross per 24 hour   Intake 2624.61 ml   Output 630 ml   Net 1994.61 ml         Laboratory  Recent Labs     10/29/23  1907 10/30/23  0301 10/31/23  0337   ISTATAPH 7.429 7.452 7.477   ISTATAPCO2 30.5 28.9 32.8   ISTATAPO2 87 349* 88*   ISTATATCO2 21 21 25   ENEEAKC0AVZ 97 100* 97   ISTATARTHCO3 20.2 20.2 24.3   ISTATARTBE -3 -2 1   ISTATTEMP 99.9 F 100.0 F 99.9 F   ISTATFIO2 40 30 30   ISTATSPEC Arterial Arterial Arterial   ISTATAPHTC 7.418 7.440 7.466   CMIKLQTR3UH 91* 353* 92*           Recent Labs     10/29/23  1506 10/30/23  0458 10/31/23  0414   SODIUM 136 135 135   POTASSIUM 4.4 4.1 3.6   CHLORIDE 101 103 102   CO2 23 20 23   BUN 11 13 17   CREATININE 0.80 0.74 0.54   MAGNESIUM  --  1.9 2.3   PHOSPHORUS  --  3.0 1.5*   CALCIUM 10.0 8.9 8.0*       Recent Labs     10/29/23  1506 10/30/23  0458 10/31/23  0414   ALTSGPT 31 27 21   ASTSGOT 44 34  24   ALKPHOSPHAT 103* 86 76   TBILIRUBIN 0.5 0.5 0.4   PREALBUMIN  --  14.7*  --    GLUCOSE 116* 133* 143*       Recent Labs     10/29/23  1506 10/30/23  0458 10/31/23  0414   WBC 10.2 11.8* 11.3*   NEUTSPOLYS 77.40* 82.50* 80.00*   LYMPHOCYTES 10.90* 7.60* 8.80*   MONOCYTES 9.90 9.10 9.10   EOSINOPHILS 0.90 0.10 1.10   BASOPHILS 0.40 0.30 0.60   ASTSGOT 44 34 24   ALTSGPT 31 27 21   ALKPHOSPHAT 103* 86 76   TBILIRUBIN 0.5 0.5 0.4       Recent Labs     10/29/23  1506 10/30/23  0458 10/31/23  0414   RBC 4.75 4.42* 3.84*   HEMOGLOBIN 15.0 13.5* 11.9*   HEMATOCRIT 43.7 41.3* 35.9*   PLATELETCT 141* 123* 103*   PROTHROMBTM 15.4*  --   --    APTT 35.2  --   --    INR 1.21*  --   --          Imaging  X-Ray:  I have personally reviewed the images and compared with prior images.  EKG:  I have personally reviewed the images and compared with prior images.  CT:    Reviewed    Assessment/Plan  * Acute ischemic stroke (HCC)- (present on admission)  Assessment & Plan  10/24 he was admitted for multi-territory infarcts and aphasia   10/29 new neuro deficits, L hemiparesis and unresponsiveness  10/29/23 Jorge performed Left M1 Thrombus and Right M1/M2 junction thrombectomy  Neuro check and vital signs per post thrombectomy protocol.  -MRI-showed multifocal bilateral cerebral hemispheric and cerebellar acute infarcts; moderate area of infarction in right posterior frontal and anterior parietal region with hemorrhagic conversion; infarction of left caudate basal ganglia and corona radiata with hemorrhagic conversion  -Discussion with family -informed and explained poor prognosis-the plan will be to give patient a few days for improvement and meet with family again to discuss options/plan  HOB > 30 degrees  Maintain normal temperature  Goal glucose 140-180  Given underlying metastatic lung cancer and multiple embolic stroke and PE, he is likely in hypercoagulable state and requires anticoagulation.  -160  BG goal .  hemoglobin A1c 6.2.  Goal < 7  Start High-dose statin therapy  No anticoagulation given stroke burden with hemorrhagic conversion  SLP/PT/OT    VTE (venous thromboembolism)  Assessment & Plan  History of DVT and PE, unclear the details as we do not have the records  He will need therapeutic AC for both this and his cardioembolic strokes when cleared by neurology    Metastatic adenocarcinoma (HCC)  Assessment & Plan  Felt to be lung as primary; supraclavicular node biopsied [facility] and + for adenocarcinoma    Further evaluation when critical illness resolves    Acute respiratory failure with hypoxia (HCC)  Assessment & Plan  10/29/23 intubated by ERP for airway protection in the setting of acute ischemic stroke  -Titrate FiO2 to keep sats greater than 90%   -Titrate ventilator prescription to optimize acid-base balance, oxygenation and ventilation  -HOB > 30 degrees and peridex for VAP prevention  -daily SBT  -Pepcid for GI prophylaxis  -SAT/SBT when able (ABCDEF Bundle)  -Early mobility  -SCDs for DVT prophylaxis, chemical VTE contraindicated  -Pepcid, chlorhexidine  -Repeat chest xray and ABG PRN   -Titration of ventilator therapy based on ABGs and patient's status  -RT/O2 protocols  -Daily and PRN ABGs      Pneumonia  Assessment & Plan  Jaylaura Olivares was treating the patient for presumed PNA  Check PCT  MRSA nare  Empiric C3 + AZT;  PCT negative-ABX dcd          VTE:  Contraindicated  Ulcer: H2 Antagonist  Lines: Trivedi Catheter  Ongoing indication addressed    I have performed a physical exam and reviewed and updated ROS and Plan today (10/31/2023). In review of yesterday's note (10/30/2023), there are no changes except as documented above.     Discussed patient condition and risk of morbidity and/or mortality with Family, RN, RT, Therapies, Pharmacy, Dietary, , Code status disscussed, Charge nurse / hot rounds, and neurology  The patient remains critically ill.  Critical care time 50  minutes in directly providing and coordinating critical care and extensive data review.  No time overlap and excludes procedures.  Please note that this dictation was created using voice recognition software. The accuracy of the dictation is limited to the abilities of the software. I have made every reasonable attempt to correct obvious errors, but I expect that there are errors of grammar and possibly content that I did not discover before finalizing the note.

## 2023-10-31 NOTE — CARE PLAN
The patient is Watcher - Medium risk of patient condition declining or worsening    Shift Goals  Clinical Goals: Improved neuro, -160  Patient Goals: TOM  Family Goals: Family updates    Progress made toward(s) clinical / shift goals:    Problem: Optimal Care of the Stroke Patient  Goal: Optimal acute care for the stroke patient  Outcome: Progressing     Problem: Knowledge Deficit - Stroke Education  Goal: Patient's knowledge of stroke and risk factors will improve  Outcome: Progressing     Problem: Discharge Planning - Stroke  Goal: Ensure Stroke Core Measures are met prior to discharge  Outcome: Progressing  Goal: Patient’s continuum of care needs will be met  Outcome: Progressing     Problem: Neuro Status  Goal: Neuro status will remain stable or improve  Outcome: Progressing     Problem: Hemodynamic Monitoring  Goal: Patient's hemodynamics, fluid balance and neurologic status will be stable or improve  Outcome: Progressing     Problem: Respiratory - Stroke Patient  Goal: Patient will achieve/maintain optimum respiratory rate/effort  Outcome: Progressing     Problem: Dysphagia  Goal: Dysphagia will improve  Outcome: Progressing     Problem: Risk for Aspiration  Goal: Patient's risk for aspiration will be absent or decrease  Outcome: Progressing     Problem: Urinary Elimination  Goal: Establish and maintain regular urinary output  Outcome: Progressing     Problem: Bowel Elimination  Goal: Establish and maintain regular bowel function  Outcome: Progressing     Problem: Mobility - Stroke  Goal: Patient's capacity to carry out activities will improve  Outcome: Progressing  Goal: Spasticity will be prevented or improved  Outcome: Progressing  Goal: Subluxation will be prevented or improved  Outcome: Progressing     Problem: Self Care  Goal: Patient will have the ability to perform ADLs independently or with assistance (bathe, groom, dress, toilet and feed)  Outcome: Progressing     Problem: Knowledge Deficit -  Standard  Goal: Patient and family/care givers will demonstrate understanding of plan of care, disease process/condition, diagnostic tests and medications  Outcome: Progressing     Problem: Skin Integrity  Goal: Skin integrity is maintained or improved  Outcome: Progressing     Problem: Pain - Standard  Goal: Alleviation of pain or a reduction in pain to the patient’s comfort goal  Outcome: Progressing     Problem: Fall Risk  Goal: Patient will remain free from falls  Outcome: Progressing       Patient is not progressing towards the following goals:  Patient withdraws to pain on all 4 extremities. Follows no commands. Does not open eyes. Spontaneous nonpurposeful movements noted on all 4 limbs.

## 2023-10-31 NOTE — PROGRESS NOTES
Patient family states that they were able to meet with other family members and discuss patient wishes as far as DNR/CODE STATUS.  Patient family states that the patient's wishes were to be DNR.  At this time I will change CODE STATUS from full code to DNR I okay

## 2023-11-01 NOTE — DOCUMENTATION QUERY
[facility]                                                                       Query Response Note      PATIENT:               ИРИНА HENRY  ACCT #:                  [id number]  MRN:                     [id number]  :                      1945  ADMIT DATE:       10/29/2023 3:03 PM  DISCH DATE:          RESPONDING  PROVIDER #:        [id number]           QUERY TEXT:    Patient transferred on 10/29 for acute ischemic stroke and per ED note has been somnolent and not arousable but moving R &L arms and legs.  Taken of sedation while remaining ventilated per 10/30 neurology PN.  10/31 Palliative care consult documents unresponsive off sedation and only responding to painful stimuli.  Based on the clinical indicators, can a diagnosis be made?        The patient's clinical indicators include:  ED note - somnolent and not arousable...moving both R and L arms and  legs  10/30 Neurology PN - intubated off sedation  10/31 Palliative consult - unresponsive off sedation...only responding to painful stimuli    Treatment - MRI brain; repeat neurological evaluations; mechanical ventilation    Risk factors - Acute ischemic stroke    Thank you,  Babs Landry BSN  Clinical   Connect via [place]  Options provided:   -- Coma due to acute ischemic stroke, present on admission is clinically relevant and ruled in   -- Coma due to acute ischemic stroke, present after admission is clinically relevant and ruled in   -- Findings of no clinical significance   -- Other explanation, (please specify the other explanation)   -- Unable to determine      Query created by: Babs Landry on 2023 8:03 AM    RESPONSE TEXT:    Coma due to acute ischemic stroke, present on admission is clinically relevant and ruled in          Electronically signed by:  DIMITRIS ADKINS MD 2023 2:31 PM

## 2023-11-01 NOTE — CARE PLAN
Problem: Ventilation  Goal: Ability to achieve and maintain unassisted ventilation or tolerate decreased levels of ventilator support  Description: Target End Date:  4 days     Document on Vent flowsheet    1.  Support and monitor invasive and noninvasive mechanical ventilation  2.  Monitor ventilator weaning response  3.  Perform ventilator associated pneumonia prevention interventions  4.  Manage ventilation therapy by monitoring diagnostic test results  Outcome: Progressing     Ventilator Daily Summary    Vent Day #  4  Airway:   8.0 @26  Ventilator settings:   CMV R16 [phone]  Weaning trials:  Spont all day , rest at night    Respiratory Procedures:    Plan: Continue current ventilator settings and wean mechanical ventilation as tolerated per physician orders.

## 2023-11-01 NOTE — CARE PLAN
Problem: Ventilation  Goal: Ability to achieve and maintain unassisted ventilation or tolerate decreased levels of ventilator support  Description: Target End Date:  4 days     Document on Vent flowsheet    1.  Support and monitor invasive and noninvasive mechanical ventilation  2.  Monitor ventilator weaning response  3.  Perform ventilator associated pneumonia prevention interventions  4.  Manage ventilation therapy by monitoring diagnostic test results  Outcome: Not Met     Ventilator Daily Summary    Vent Day #4    Airway: 8.0 @ 26     Ventilator settings: APV/CMV -- 16 / 480 / 8+ / 30%    Weaning trials: None    Respiratory Procedures: None    Plan: Continue current ventilator settings and wean mechanical ventilation as tolerated per physician orders.

## 2023-11-01 NOTE — PROGRESS NOTES
7 beats of vtach noted on monitor while writer in room and patient resting. VSS, see flowsheets. Resumed sinus rhythm.

## 2023-11-01 NOTE — PROGRESS NOTES
Neurology Progress Note  Neurohospitalist Service, [facility]    Referring Physician: KISHORE Sheikh*    Chief Complaint   Patient presents with    Possible Stroke       HPI: Refer to initial documented Neurology H&P, as detailed in the patient's chart.    Interval History 11/1: No significant events overnight.        Medications:    Current Facility-Administered Medications:     electrolyte-A (Plasmalyte-A) infusion, , Intravenous, Continuous, THALIA OsullivanRKathyNKathy, Last Rate: 50 mL/hr at 11/01/23 0705, Rate Verify at 11/01/23 0705    Respiratory Therapy Consult, , Nebulization, Continuous RT, Omari Crawford M.D.    famotidine (Pepcid) tablet 20 mg, 20 mg, Enteral Tube, Q12HRS, 20 mg at 11/01/23 0504 **OR** famotidine (Pepcid) injection 20 mg, 20 mg, Intravenous, Q12HRS, Omari Crawford M.D.    MD Alert...ICU Electrolyte Replacement per Pharmacy, , Other, PHARMACY TO DOSE, Omari Crawford M.D.    lidocaine (Xylocaine) 1 % injection 2 mL, 2 mL, Tracheal Tube, Q30 MIN PRN, Omari Crawford M.D.    fentaNYL (Sublimaze) injection 50 mcg, 50 mcg, Intravenous, Q15 MIN PRN **AND** fentaNYL (Sublimaze) injection 100 mcg, 100 mcg, Intravenous, Q15 MIN PRN **AND** fentaNYL (SUBLIMAZE) 50 mcg/mL in 50mL (Continuous Infusion), , Intravenous, Continuous, Dose not Required at 10/29/23 1800 **AND** [DISCONTINUED] dexmedetomidine (PRECEDEX) 400 mcg/100mL NS premix infusion, 0-0.7 mcg/kg/hr (Ideal), Intravenous, Continuous, Omari Crawford M.D.    ipratropium-albuterol (DUONEB) nebulizer solution, 3 mL, Nebulization, Q2HRS PRN (RT), Omari Crawford M.D.    labetalol (Normodyne/Trandate) injection 10 mg, 10 mg, Intravenous, Q10 MIN PRN, Sohan Thakur M.D.    hydrALAZINE (Apresoline) injection 10 mg, 10 mg, Intravenous, Q2HRS PRN, Sohan Thakur M.D.    propofol (DIPRIVAN) injection, 0-80 mcg/kg/min (Ideal), Intravenous, Continuous, Paused at 10/29/23 7743 **AND** Triglycerides  Starting now and then Every 3 Days, , , Every 3 Days (0300), Omari Crawford M.D.    Pharmacy Consult: Enteral tube insertion - review meds/change route/product selection, 1 Each, Other, PHARMACY TO DOSE, Omari Crawford M.D.    atorvastatin (Lipitor) tablet 40 mg, 40 mg, Enteral Tube, Q EVENING, Omari Crawford M.D., 40 mg at 10/31/23 1823    senna-docusate (Pericolace Or Senokot S) 8.6-50 MG per tablet 2 Tablet, 2 Tablet, Enteral Tube, BID, 2 Tablet at 11/01/23 0504 **AND** polyethylene glycol/lytes (Miralax) PACKET 1 Packet, 1 Packet, Enteral Tube, QDAY PRN **AND** magnesium hydroxide (Milk Of Magnesia) suspension 30 mL, 30 mL, Enteral Tube, QDAY PRN **AND** bisacodyl (Dulcolax) suppository 10 mg, 10 mg, Rectal, QDAY PRN, Omari Crawford M.D.    norepinephrine (Levophed) 8 mg in 250 mL NS infusion (premix), 0-1 mcg/kg/min (Ideal), Intravenous, Continuous, Omari Crawford M.D., Last Rate: 15 mL/hr at 11/01/23 0705, 0.1 mcg/kg/min at 11/01/23 0705    Physical Examination:     Vitals:    11/01/23 0800 11/01/23 0815 11/01/23 0830 11/01/23 0845   BP: 139/69 121/58 117/59    Pulse: (!) 50 (!) 51 (!) 48 (!) 47   Resp: 17 17 17 16   Temp:       TempSrc: Bladder      SpO2: 96% 99% 99% 98%   Weight:       Height:               NEUROLOGICAL EXAM:     Patient is intubated off sedation.  Not interactive.  Minimal response to noxious stimuli.  Does not open his eyes spontaneously.  No gaze preference.  Pupils are equal but minimally reactive.  Positive gag on suctioning.  Positive corneal reflex.  Vestibular response intact.  Slight movement to noxious stimuli in all 4.    Reflexes are mute.  No obvious signs ataxia.    No segment changes compared 10/31    Objective Data:    Labs:  Lab Results   Component Value Date/Time    PROTHROMBTM 15.4 (H) 10/29/2023 03:06 PM    INR 1.21 (H) 10/29/2023 03:06 PM      Lab Results   Component Value Date/Time    WBC 11.2 (H) 11/01/2023 04:00 AM    RBC 4.02 (L) 11/01/2023 04:00 AM     HEMOGLOBIN 12.3 (L) 11/01/2023 04:00 AM    HEMATOCRIT 36.9 (L) 11/01/2023 04:00 AM    MCV 91.8 11/01/2023 04:00 AM    MCH 30.6 11/01/2023 04:00 AM    MCHC 33.3 11/01/2023 04:00 AM    MPV 9.9 11/01/2023 04:00 AM    NEUTSPOLYS 80.70 (H) 11/01/2023 04:00 AM    LYMPHOCYTES 8.10 (L) 11/01/2023 04:00 AM    MONOCYTES 8.80 11/01/2023 04:00 AM    EOSINOPHILS 1.40 11/01/2023 04:00 AM    BASOPHILS 0.50 11/01/2023 04:00 AM      Lab Results   Component Value Date/Time    SODIUM 134 (L) 11/01/2023 04:00 AM    POTASSIUM 4.1 11/01/2023 04:00 AM    CHLORIDE 102 11/01/2023 04:00 AM    CO2 23 11/01/2023 04:00 AM    GLUCOSE 154 (H) 11/01/2023 04:00 AM    BUN 19 11/01/2023 04:00 AM    CREATININE 0.53 11/01/2023 04:00 AM    GLOMRATE 104 10/29/2023 02:27 AM      Lab Results   Component Value Date/Time    CHOLSTRLTOT 105 10/30/2023 04:58 AM    LDL 47 10/30/2023 04:58 AM    HDL 41 10/30/2023 04:58 AM    TRIGLYCERIDE 76 10/31/2023 04:14 AM       Lab Results   Component Value Date/Time    ALKPHOSPHAT 101 (H) 11/01/2023 04:00 AM    ASTSGOT 47 (H) 11/01/2023 04:00 AM    ALTSGPT 43 11/01/2023 04:00 AM    TBILIRUBIN 0.5 11/01/2023 04:00 AM        Imaging/Testing:  DX-CHEST-PORTABLE (1 VIEW)   Final Result         1.  Scattered hazy bilateral pulmonary infiltrates, greater on left, slightly decreased since prior study.   2.  Atherosclerosis      MR-BRAIN-W/O   Final Result         Multifocal bilateral cerebral hemispheric and cerebellar acute infarcts.      Moderate-sized confluent area of infarction noted in the right posterior frontal and anterior parietal region in the right MCA territory with minimal hemorrhage within but without mass effect or midline shift.      Confluent area of acute infarction involving the left caudate, basal ganglia and adjacent corona radiata with minimal hemorrhage within but without mass effect or midline shift.      DX-CHEST-PORTABLE (1 VIEW)   Final Result      1.  Medial left base airspace disease.   2.  2  metallic buttons project at the left chest. No other metallic foreign body seen.      EC-ECHOCARDIOGRAM COMPLETE W/ CONT   Final Result      DX-CHEST-PORTABLE (1 VIEW)   Final Result         1.  Hazy left pulmonary opacity suggests subtle infiltrates.   2.  Atherosclerosis      DX-ABDOMEN FOR TUBE PLACEMENT   Final Result      Feeding tube tip projects in the second portion of duodenum.      Nasogastric tube tip is in the distal stomach or first portion of the duodenum.      IR-THROMBO MECHANICAL ARTERY,INIT   Final Result      1.  Occluded LEFT M1 segment. Successful mechanical thrombectomy.TICI 2b flow.   2.  Occluded RIGHT M1 M2 junction. Successful mechanical thrombectomy.TICI 2b flow      DX-CHEST-LIMITED (1 VIEW)   Final Result         Intubated. No airspace consolidation identified.      CT-CEREBRAL PERFUSION ANALYSIS   Final Result      1.  Cerebral blood flow less than 30% likely representing completed infarct = 15 mL.      2.  T Max more than 6 seconds likely representing combination of completed infarct and ischemia = 126 mL.      3.  Mismatched volume likely representing ischemic brain/penumbra = 111      4.  Please note that the cerebral perfusion was performed on the limited brain tissue around the basal ganglia region. Infarct/ischemia outside the CT perfusion sections can be missed in this study.            Assessment and Plan:    78-year-old male with metastatic lung cancer.  Was admitted at outside facility.  Due to PEs and small infarcts.  Was on Xarelto that was changed to Lovenox then was changed to Eliquis.  Sometime yesterday morning or the night before patient had a sudden change in neuro status.  Found to have bilateral MCA occlusions.  Was transferred here for thrombectomy.  Thrombectomy was successful with bilateral TICI score 2B.  Of the right distal M1 occlusion and the left M1 thrombus.  Patient now is intubated.  Minimally interactive.  We will get imaging today to assess stroke  burden.  But given the overall situation with cancer causing most likely a severe hypercoagulable state that is refractory to anticoagulation prognosis is poor.      Update 10/31  No significant change in examination today.  Patient remains off sedation.  We will continue to monitor for the next 1 to 2 days.  Had a meeting yesterday to family including patient's wife at bedside.  Explained the poor prognosis.  Given the stroke burden with the underlying metastatic cancer.    Update 11/1  No significant changes in today's exam.  Patient is not following commands.  Slight movement to noxious stimuli on the right arm and leg.  Recommend palliative care consultation with family.  For the prognosis very poor in this patient.  Even if patient was given a prolonged time to recover neurologically patient has underlying cancer which would not be treated into the patient improves neurologically.  At this time I do not have any further recommendations from neurological standpoint.  Except continue supportive therapy and discussed with family about the poor prognosis.  If it is determined to continue with aggressive therapies can start anticoagulation 7 days after the infarct which will be next Sunday on November 5.    Plan:  1.  Continue supportive therapies  2.  Blood pressure parameters between 120s to 160s systolic.  3.  Okay to start Eliquis on November 5th  4.  Palliative care consultation get clarification goals of care    Neurology will sign off this time.    This chart was partially generated using voice recognition technology and sound alike word replacement may be present, best efforts were made to make the chart accurate.    Omari Nguyen MD  Board Certified Neurology, ABPN  t) [phone]

## 2023-11-01 NOTE — PROGRESS NOTES
Monitor Summary    SB with 1st Degree HB; 1 run of vtach x 7 beats  DC 0.196 - 0.219  QRS 0.051  QT 0.354

## 2023-11-01 NOTE — CARE PLAN
The patient is Watcher - Medium risk of patient condition declining or worsening    Shift Goals  Clinical Goals: Improved Neuro, -160  Patient Goals: kristina  Family Goals: Family Updates, patient comfort    Progress made toward(s) clinical / shift goals:    Problem: Optimal Care of the Stroke Patient  Goal: Optimal acute care for the stroke patient  Outcome: Progressing     Problem: Knowledge Deficit - Stroke Education  Goal: Patient's knowledge of stroke and risk factors will improve  Outcome: Progressing     Problem: Psychosocial - Patient Condition  Goal: Patient's ability to verbalize feelings about condition will improve  Outcome: Progressing  Goal: Patient's ability to re-evaluate and adapt role responsibilities will improve  Outcome: Progressing     Problem: Discharge Planning - Stroke  Goal: Ensure Stroke Core Measures are met prior to discharge  Outcome: Progressing  Goal: Patient’s continuum of care needs will be met  Outcome: Progressing     Problem: Neuro Status  Goal: Neuro status will remain stable or improve  Outcome: Progressing     Problem: Hemodynamic Monitoring  Goal: Patient's hemodynamics, fluid balance and neurologic status will be stable or improve  Outcome: Progressing     Problem: Respiratory - Stroke Patient  Goal: Patient will achieve/maintain optimum respiratory rate/effort  Outcome: Progressing     Problem: Dysphagia  Goal: Dysphagia will improve  Outcome: Progressing     Problem: Risk for Aspiration  Goal: Patient's risk for aspiration will be absent or decrease  Outcome: Progressing     Problem: Urinary Elimination  Goal: Establish and maintain regular urinary output  Outcome: Progressing     Problem: Bowel Elimination  Goal: Establish and maintain regular bowel function  Outcome: Progressing     Problem: Mobility - Stroke  Goal: Patient's capacity to carry out activities will improve  Outcome: Progressing  Goal: Spasticity will be prevented or improved  Outcome: Progressing  Goal:  Subluxation will be prevented or improved  Outcome: Progressing     Problem: Self Care  Goal: Patient will have the ability to perform ADLs independently or with assistance (bathe, groom, dress, toilet and feed)  Outcome: Progressing     Problem: Knowledge Deficit - Standard  Goal: Patient and family/care givers will demonstrate understanding of plan of care, disease process/condition, diagnostic tests and medications  Outcome: Progressing     Problem: Skin Integrity  Goal: Skin integrity is maintained or improved  Outcome: Progressing     Problem: Pain - Standard  Goal: Alleviation of pain or a reduction in pain to the patient’s comfort goal  Outcome: Progressing     Problem: Fall Risk  Goal: Patient will remain free from falls  Outcome: Progressing       Patient is not progressing towards the following goals:    Patient withdraws to pain on all 4 extremities. Follows no commands. Does not open eyes. Spontaneous nonpurposeful movements noted on all 4 limbs.

## 2023-11-01 NOTE — PROGRESS NOTES
Critical Care Progress Note    Date of admission  10/29/2023    Chief Complaint  78 y.o. male admitted 10/29/2023 with changes in LOC    Hospital Course  Mateusz Luciano is a 78-year-old male with a past medical history of DVT/PE (Xarelto) metastatic adenocarcinoma of the lung hypertension, GERD who was recently hospitalized at [facility] on 10/26/2023 due to PE and multiple territory embolic infarcts.  Apparently he was initially started on Xarelto, changed to Lovenox and then  Eliquis, however he continued to experience embolic events.  On the morning of 10/29 at approximately 0900 he was found unresponsive -LKW 0200.  He underwent CT angio of head and neck which showed a left M1 occlusion therefore he was transferred to [facility] for higher level of care possible IR intervention.  Upon arrival to [facility] he was intubated and transported to IR where he underwent thrombectomies for a left M1 thrombus and right M1/M2 junction thrombus, both resulting in TICI 2B.  He was transferred to ICU for critical care management.   10/30-titrated off of all sedation with little improvement in neuro exam.  -MRI-showed multifocal bilateral cerebral hemispheric and cerebellar acute infarcts; moderate area of infarction in right posterior frontal and anterior parietal region with hemorrhagic conversion; infarction of left caudate basal ganglia and corona radiata with hemorrhagic conversion-Per Neuro  hold anticoagulation, ASA due to stroke burden with hemorrhagic conversion.   10/31- code status changed- DNR- I okay     Interval Problem Update  Reviewed last 24 hour events:    T max - 100.4  -bradycardia with some episodes of SVT overnight  -Continue to titrate levophed infusion to Keep SBP within parameters -160  -Vent day #4- spontaneous resp on vent   -November 5tth  -LBM-PTA  -Holding anticoagulation/ASA due to stroke burden and hemorrhagic conversion  -Vent day #3- Continue to titrate vent settings    -Update family on the phone-     Review of Systems  Review of Systems   Unable to perform ROS: Critical illness        Vital Signs for last 24 hours   Pulse:  [52-76] 54  Resp:  [16-45] 19  BP: (112-151)/(55-80) 135/72  SpO2:  [90 %-99 %] 95 %    Hemodynamic parameters for last 24 hours       Respiratory Information for the last 24 hours  Vent Mode: APVCMV  Rate (breaths/min): 16  Vt Target (mL): 480  PEEP/CPAP: 8  P Support: 5  MAP: 9.8  Control VTE (exp VT): 481    Physical Exam   Physical Exam  Vitals and nursing note reviewed.   Constitutional:       Interventions: He is intubated.      Comments: No sedation   HENT:      Head: Normocephalic and atraumatic.      Mouth/Throat:      Mouth: Mucous membranes are moist.      Pharynx: Oropharynx is clear.   Cardiovascular:      Rate and Rhythm: Regular rhythm. Bradycardia present.      Pulses:           Radial pulses are 2+ on the right side and 2+ on the left side.      Heart sounds: Heart sounds are distant.      Comments: Bilateral Pedal pulses faint to palpation   Pulmonary:      Effort: He is intubated.      Breath sounds: Examination of the right-lower field reveals decreased breath sounds. Examination of the left-lower field reveals decreased breath sounds. Decreased breath sounds present.   Chest:   Breasts:     Breasts are symmetrical.   Abdominal:      Comments: Abdomen soft, round, nondistended sounds present     Genitourinary:     Comments: Trivedi to down drain with clear yellow urine  Musculoskeletal:      Right lower leg: No edema.      Left lower leg: No edema.   Skin:     General: Skin is cool.      Capillary Refill: Capillary refill takes 2 to 3 seconds.      Comments: Cores warm extremities cool   Neurological:      Comments: Intubated without sedation  Shaking movement  with painful stimuli  BRAULIO 3-3.5 mm  Roving eye movement    Psychiatric:      Comments: Intubated        Medications  Current Facility-Administered Medications   Medication Dose  Route Frequency Provider Last Rate Last Admin    electrolyte-A (Plasmalyte-A) infusion   Intravenous Continuous SHAN OsullivanP.R.N. 50 mL/hr at 11/01/23 0032 Rate Verify at 11/01/23 0032    Respiratory Therapy Consult   Nebulization Continuous RT Omari Crawford M.D.        famotidine (Pepcid) tablet 20 mg  20 mg Enteral Tube Q12HRS Omari Crawford M.D.   20 mg at 11/01/23 0504    Or    famotidine (Pepcid) injection 20 mg  20 mg Intravenous Q12HRS Omari Crawford M.D. MD Alert...ICU Electrolyte Replacement per Pharmacy   Other PHARMACY TO DOSE Omari Crawford M.D.        lidocaine (Xylocaine) 1 % injection 2 mL  2 mL Tracheal Tube Q30 MIN PRN Omari Crawford M.D.        fentaNYL (Sublimaze) injection 50 mcg  50 mcg Intravenous Q15 MIN PRN Omari Crawford M.D.        And    fentaNYL (Sublimaze) injection 100 mcg  100 mcg Intravenous Q15 MIN PRN Omari Crawford M.D.        And    fentaNYL (SUBLIMAZE) 50 mcg/mL in 50mL (Continuous Infusion)   Intravenous Continuous Omari Crawford M.D.   Dose not Required at 10/29/23 1800    ipratropium-albuterol (DUONEB) nebulizer solution  3 mL Nebulization Q2HRS PRN (RT) Omari Crawford M.D.        labetalol (Normodyne/Trandate) injection 10 mg  10 mg Intravenous Q10 MIN PRN Sohan Thakur M.D.        hydrALAZINE (Apresoline) injection 10 mg  10 mg Intravenous Q2HRS PRN Sohan Thakur M.D.        propofol (DIPRIVAN) injection  0-80 mcg/kg/min (Ideal) Intravenous Continuous Omari Crawford M.D.   Paused at 10/29/23 1818    Pharmacy Consult: Enteral tube insertion - review meds/change route/product selection  1 Each Other PHARMACY TO DOSE Omari Crawford M.D.        atorvastatin (Lipitor) tablet 40 mg  40 mg Enteral Tube Q EVENING Omari Crawford M.D.   40 mg at 10/31/23 1823    senna-docusate (Pericolace Or Senokot S) 8.6-50 MG per tablet 2 Tablet  2 Tablet Enteral Tube BID Omari Crawford M.D.   2 Tablet at 11/01/23 0504    And     polyethylene glycol/lytes (Miralax) PACKET 1 Packet  1 Packet Enteral Tube QDAY PRN Omari Crawford M.D.        And    magnesium hydroxide (Milk Of Magnesia) suspension 30 mL  30 mL Enteral Tube QDAY PRN Omari Crawford M.D.        And    bisacodyl (Dulcolax) suppository 10 mg  10 mg Rectal QDAY PRN Omari Crawford M.D.        norepinephrine (Levophed) 8 mg in 250 mL NS infusion (premix)  0-1 mcg/kg/min (Ideal) Intravenous Continuous Omari Crawford M.D. 18 mL/hr at 11/01/23 0032 0.12 mcg/kg/min at 11/01/23 0032       Fluids    Intake/Output Summary (Last 24 hours) at 11/1/2023 0614  Last data filed at 11/1/2023 0600  Gross per 24 hour   Intake 3792.59 ml   Output 1450 ml   Net 2342.59 ml         Laboratory  Recent Labs     10/29/23  1907 10/30/23  0301 10/31/23  0337   ISTATAPH 7.429 7.452 7.477   ISTATAPCO2 30.5 28.9 32.8   ISTATAPO2 87 349* 88*   ISTATATCO2 21 21 25   ALLSPCV1FGB 97 100* 97   ISTATARTHCO3 20.2 20.2 24.3   ISTATARTBE -3 -2 1   ISTATTEMP 99.9 F 100.0 F 99.9 F   ISTATFIO2 40 30 30   ISTATSPEC Arterial Arterial Arterial   ISTATAPHTC 7.418 7.440 7.466   [id number] 91* 353* 92*           Recent Labs     10/30/23  0458 10/31/23  0414 10/31/23  2100 11/01/23  0400   SODIUM 135 135  --  134*   POTASSIUM 4.1 3.6  --  4.1   CHLORIDE 103 102  --  102   CO2 20 23  --  23   BUN 13 17  --  19   CREATININE 0.74 0.54  --  0.53   MAGNESIUM 1.9 2.3  --  2.0   PHOSPHORUS 3.0 1.5* 2.6 2.5   CALCIUM 8.9 8.0*  --  8.8       Recent Labs     10/30/23  0458 10/31/23  0414 11/01/23  0400   ALTSGPT 27 21 43   ASTSGOT 34 24 47*   ALKPHOSPHAT 86 76 101*   TBILIRUBIN 0.5 0.4 0.5   PREALBUMIN 14.7*  --   --    GLUCOSE 133* 143* 154*       Recent Labs     10/30/23  0458 10/31/23  0414 11/01/23  0400   WBC 11.8* 11.3* 11.2*   NEUTSPOLYS 82.50* 80.00* 80.70*   LYMPHOCYTES 7.60* 8.80* 8.10*   MONOCYTES 9.10 9.10 8.80   EOSINOPHILS 0.10 1.10 1.40   BASOPHILS 0.30 0.60 0.50   ASTSGOT 34 24 47*   ALTSGPT 27 21 43    ALKPHOSPHAT 86 76 101*   TBILIRUBIN 0.5 0.4 0.5       Recent Labs     10/29/23  1506 10/30/23  0458 10/31/23  0414 11/01/23  0400   RBC 4.75 4.42* 3.84* 4.02*   HEMOGLOBIN 15.0 13.5* 11.9* 12.3*   HEMATOCRIT 43.7 41.3* 35.9* 36.9*   PLATELETCT 141* 123* 103* 82*   PROTHROMBTM 15.4*  --   --   --    APTT 35.2  --   --   --    INR 1.21*  --   --   --          Imaging  X-Ray:  I have personally reviewed the images and compared with prior images.  EKG:  I have personally reviewed the images and compared with prior images.  CT:    Reviewed    Assessment/Plan  * Acute ischemic stroke (HCC)- (present on admission)  Assessment & Plan  10/24 he was admitted for multi-territory infarcts and aphasia   10/29 new neuro deficits, L hemiparesis and unresponsiveness  10/29/23 Jorge performed Left M1 Thrombus and Right M1/M2 junction thrombectomy   -Neuro checks and vital signs per post thrombectomy protocol.  -MRI-showed multifocal bilateral cerebral hemispheric and cerebellar acute infarcts; moderate area of infarction in right posterior frontal and anterior parietal region with hemorrhagic conversion; infarction of left caudate basal ganglia and corona radiata with hemorrhagic conversion  -Discussion with family -Discussed updates with wife on the phone   HOB > 30 degrees  -Maintain normal temperature  -Goal glucose 140-180  -Given underlying metastatic lung cancer and multiple embolic stroke and PE, he is likely in a hypercoagulable state who failed prvious anticoagulation.  -160  BG goal . hemoglobin A1c 6.2.  Goal < 7  Start High-dose statin therapy  No anticoagulation given stroke burden with hemorrhagic conversion  SLP/PT/OT    VTE (venous thromboembolism)  Assessment & Plan  History of DVT and PE, unclear the details as we do not have the records  He will need therapeutic AC for both this and his cardioembolic strokes when cleared by neurology    Metastatic adenocarcinoma (HCC)  Assessment & Plan  Felt to be  lung as primary; supraclavicular node biopsied [facility]24 and + for adenocarcinoma  Further evaluation when critical illness resolves    Acute respiratory failure with hypoxia (HCC)  Assessment & Plan  10/29/23 intubated by ERP for airway protection in the setting of acute ischemic stroke  -Titrate FiO2 to keep sats greater than 90%   -Titrate ventilator prescription to optimize acid-base balance, oxygenation and ventilation  -HOB > 30 degrees and peridex for VAP prevention  -daily SBT  -Pepcid for GI prophylaxis  -SAT/SBT when able (ABCDEF Bundle)  -Early mobility  -SCDs for DVT prophylaxis, chemical VTE contraindicated  -Pepcid, chlorhexidine  -Repeat chest xray and ABG PRN   -Titration of ventilator therapy based on ABGs and patient's status  -RT/O2 protocols  -Daily and PRN ABGs      Pneumonia  Assessment & Plan  [facility] was treating the patient for presumed PNA  Empiric ABX dcd with negative cultures         VTE:  Contraindicated  Ulcer: H2 Antagonist  Lines: Trivedi Catheter  Ongoing indication addressed    I have performed a physical exam and reviewed and updated ROS and Plan today (11/1/2023). In review of yesterday's note (10/31/2023), there are no changes except as documented above.     Discussed patient condition and risk of morbidity and/or mortality with Family, RN, RT, Therapies, Pharmacy, Dietary, , Code status disscussed, Charge nurse / hot rounds, and neurology  The patient remains critically ill.  Critical care time 45 minutes in directly providing and coordinating critical care and extensive data review.  No time overlap and excludes procedures.  Please note that this dictation was created using voice recognition software. The accuracy of the dictation is limited to the abilities of the software. I have made every reasonable attempt to correct obvious errors, but I expect that there are errors of grammar and possibly content that I did not discover before finalizing the  note.

## 2023-11-02 PROBLEM — J69.0 ASPIRATION PNEUMONIA OF BOTH LOWER LOBES DUE TO GASTRIC SECRETIONS (HCC): Status: ACTIVE | Noted: 2023-01-01

## 2023-11-02 PROBLEM — Z51.5 COMFORT MEASURES ONLY STATUS: Status: ACTIVE | Noted: 2023-01-01

## 2023-11-02 NOTE — PROGRESS NOTES
Palliative Care   Multiple family members at bedside.  Appreciate updates from CLAUDIA Parham.  Supportive visit to patient's wife and other family members at bedside.  Introduced myself in person and encouraged them to reach out with any questions or needs throughout the day.  Of care contact information on communication board.    KYLE Keith.  Palliative Care Nurse Practitioner  [phone]

## 2023-11-02 NOTE — DIETARY
Nutrition Services Brief Note: TF discontinued on 11/1. Pt now comfort care. RD no longer following. Consult RD if pt's status changes, RD available PRN.

## 2023-11-02 NOTE — CARE PLAN
The patient is Unstable - High likelihood or risk of patient condition declining or worsening - comfort care    Shift Goals  Clinical Goals: Comfort  Patient Goals: TOM  Family Goals: Comfort    Progress made toward(s) clinical / shift goals: Patient medicated per MAR.    Problem: Urinary Elimination  Goal: Establish and maintain regular urinary output  Outcome: Progressing     Problem: Knowledge Deficit - Standard  Goal: Patient and family/care givers will demonstrate understanding of plan of care, disease process/condition, diagnostic tests and medications  Outcome: Progressing     Problem: Skin Integrity  Goal: Skin integrity is maintained or improved  Outcome: Progressing     Problem: Pain - Standard  Goal: Alleviation of pain or a reduction in pain to the patient’s comfort goal  Outcome: Progressing     Problem: Fall Risk  Goal: Patient will remain free from falls  Outcome: Progressing     Problem: Knowledge Deficit - Comfort Care  Goal: Patient and family/care givers will demonstrate understanding of dying process and grieving  Outcome: Progressing     Problem: Psychosocial - Comfort Care  Goal: Spiritual and cultural needs incorporated into hospitalization  Outcome: Progressing  Goal: Patient's level of anxiety will decrease  Outcome: Progressing  Goal: Patient and family will demonstrate ability to cope with life altering diagnosis and/or procedure  Outcome: Progressing  Goal: Privacy will be maintained for patient and family  Outcome: Progressing     Problem: Discharge Planning - Comfort Care  Goal: Patient's continuum of care needs are met  Outcome: Progressing     Problem: Respiratory - Comfort Care  Goal: Patient's respiratory function will be supported  Outcome: Progressing       Patient is not progressing towards the following goals:      Problem: Neuro Status  Goal: Neuro status will remain stable or improve  Outcome: Not Progressing     Problem: Mobility - Stroke  Goal: Patient's capacity to carry  out activities will improve  Outcome: Not Progressing     Problem: Self Care  Goal: Patient will have the ability to perform ADLs independently or with assistance (bathe, groom, dress, toilet and feed)  Outcome: Not Progressing

## 2023-11-02 NOTE — CARE PLAN
The patient is Stable - Low risk of patient condition declining or worsening    Shift Goals  Clinical Goals: comfort  Patient Goals: TOM  Family Goals: TOM    Progress made toward(s) clinical / shift goals:      Problem: Pain - Standard  Goal: Alleviation of pain or a reduction in pain to the patient’s comfort goal  Outcome: Progressing     Problem: Knowledge Deficit - Comfort Care  Goal: Patient and family/care givers will demonstrate understanding of dying process and grieving  Outcome: Progressing     Problem: Psychosocial - Comfort Care  Goal: Patient and family will demonstrate ability to cope with life altering diagnosis and/or procedure  Outcome: Progressing     Problem: Psychosocial - Comfort Care  Goal: Privacy will be maintained for patient and family  Outcome: Progressing         Patient on comfort care, Q2 hour turns in place for patients comfort. Family educated on comfort care and process, verbalize understanding. Pain assessed via CPOT pain scale, PRNs available.      Patient is not progressing towards the following goals: N/A

## 2023-11-02 NOTE — PROGRESS NOTES
Family at bedside with pt wife.  The family has decided to transition Mateusz to comfort care.  Comfort care orders placed.

## 2023-11-02 NOTE — DISCHARGE PLANNING
Case Management Discharge Planning    Admission Date: 10/29/2023  GMLOS: 5.1  ALOS: 4    6-Clicks ADL Score: 6  6-Clicks Mobility Score: 6      Anticipated Discharge Dispo: Discharge Disposition: D/T to hospice home (50)    DME Needed: No    Action(s) Taken: Met with pt and family has elected hospice, obtained choice and faxed to DPA, family has elected [facility]. Contacted [facility], RN to meet with family today at 1600. Plan to dc tomorrow if DME can be delivered. Will arrange transport time tomorrow with [facility].    Escalations Completed: None    Medically Clear: Yes    Next Steps: Pending hospice acceptance.     Barriers to Discharge: None    Is the patient up for discharge tomorrow: Yes    Is transport arranged for discharge disposition: Yes

## 2023-11-03 NOTE — DISCHARGE SUMMARY
Death Summary    Cause of Death  Bilateral Middle Cerebral Artery Cardiovascular Accident due to Thromboembolism due to Metastatic Lung Adenocarcinoma due to Tobacco Use Disorder.    Comorbid Conditions at the Time of Death  Principal Problem:    Acute ischemic stroke (HCC) (POA: Yes)  Active Problems:    Aspiration pneumonia of both lower lobes due to gastric secretions (HCC) (POA: Yes)    Acute respiratory failure with hypoxia (HCC) (POA: Yes)    Metastatic adenocarcinoma (HCC) (POA: Yes)    VTE (venous thromboembolism) (POA: Yes)    Comfort measures only status (POA: No)  Resolved Problems:    * No resolved hospital problems. *      History of Presenting Illness and Hospital Course  This is a 78 y.o. male with recent diagnosis of lung adenocarcinoma transferred from [facility] 10/29/2023 with recurrent thromboembolic stroke.    He was transferred from [facility] to [facility] for neuro-interventional IR due to CTA demonstration of Left MCA and Right ICA occlusions. He was intubated for airway protection. Neurology was consulted and he underwent thrombectomy of left M1 and Right M1/M2 junction thrombi. He was monitored in the ICU post-procedure. He was minimally interactive even after weaning off sedation. Neurology determined the stroke burden from bilateral MCA occlusions likely resulted in irreversible neurologic injury. In setting of metastatic disease he would be unlikely to be a candidate for antineoplastic therapy due to his underlying encephalopathy. Palliative was consulted and he was transitioned to comfort-only measures. He passed peacefully during coordination of home hospice services.     I was notified at 0850 by Charge MITESH Malin that Mr. Luciano had passed. I evaluated him at bedside at 0853. He was unresponsive to voice / touch / pain, had no visible nor audible cardiopulmonary activity, and had fixed and dilated pupils. Next of kin, nieces, notified at bedside. Condolences expressed.

## 2023-11-03 NOTE — CARE PLAN
The patient is Stable - Low risk of patient condition declining or worsening    Shift Goals  Clinical Goals: comfort  Patient Goals: TOM  Family Goals: patient comfort    Progress made toward(s) clinical / shift goals:  Patient obtunded, medicated per MAR for pain and anxiety. Family present, declining turns for patient comfort.      Problem: Fall Risk  Goal: Patient will remain free from falls  Description: Target End Date:  Prior to discharge or change in level of care    Document interventions on the [facility] Fall Risk Assessment    1.  Assess for fall risk factors  2.  Implement fall precautions  Outcome: Progressing     Problem: Knowledge Deficit - Comfort Care  Goal: Patient and family/care givers will demonstrate understanding of dying process and grieving  Description: Target End Date:  1-3 days or as soon as patient condition allows    Provide support and education regarding the dying process and grieving.  Outcome: Progressing       Patient is not progressing towards the following goals:

## 2023-11-03 NOTE — DISCHARGE PLANNING
DC Transport Scheduled    Received request at: 11/3/2023 at 0835    Transport Company Scheduled:  [employer]  Spoke with Jen at [facility] to schedule transport.    Scheduled Date: 11/3/2023  Scheduled Time: 1300    Destination: Home at [facility] [place]    Notified care team of scheduled transport via Voalte.     If there are any changes needed to the DC transportation scheduled, please contact [facility] at ext. [phone] between the hours of 5156-1692 Mon-Fri. If outside those hours, contact the ED Case Manager at ext. [phone].

## 2023-11-03 NOTE — ASSESSMENT & PLAN NOTE
Due to thromboembolism  S/p IR thrombectomy of bilateral MCAs  Poor prognosis per Neurology, transitioned to comfort measures  Comfort Care

## 2023-11-03 NOTE — ASSESSMENT & PLAN NOTE
Diagnosed by biopsy 10/24, not yet evaluated by oncology nor pulmonology  Not a candidate for antineoplastic therapy due to catastrophic CVA  Comfort Care

## 2023-11-03 NOTE — PROGRESS NOTES
Hospital Medicine Daily Progress Note    Date of Service  11/2/2023    Chief Complaint  Mateusz Luciano is a 78 y.o. male with metastatic lung adenocarcinoma to supraclavicular nodes, HTN, h/o DVT/PE on xarelto, and prior CVA admitted 10/29/2023 with ongoing thrombembolic CVA for IR thrombectomy.    Hospital Course  No notes on file    Interval Problem Update    Transferred to CNU on comfort measures.  Per family he was breathing heavily but improved with SL morphine, lorazepam, and atropine.  He has not awoken nor spoke.  POC discussed with his wife and nieces at bedside. Explained role of hospice in expertise for EOL care. Family believes his wishes would be to return home.  Referral accepted by [facility], planning for consultation this evening.    I have discussed this patient's plan of care and discharge plan at IDT rounds today with Case Management, Nursing, Nursing leadership, and other members of the IDT team.    Consultants/Specialty  critical care, neurology, and palliative care    Code Status  Comfort Care/DNR    Disposition  The patient is medically cleared for discharge to home or a post-acute facility.  Anticipate discharge to: hospice    I have placed the appropriate orders for post-discharge needs.    Review of Systems  Review of Systems   Unable to perform ROS: Patient unresponsive        Physical Exam  Pulse:  [83-90] 83  Resp:  [15-19] 18  SpO2:  [47 %-67 %] 65 %    Physical Exam  Vitals and nursing note reviewed. Exam conducted with a chaperone present (Wife and nieces at bedside).   HENT:      Head: Normocephalic.      Nose: Nose normal.      Mouth/Throat:      Mouth: Mucous membranes are dry.   Pulmonary:      Effort: Pulmonary effort is normal.   Abdominal:      General: Abdomen is protuberant.   Genitourinary:     Comments: No guardado  Neurological:      Comments: Obtunded. Does not respond to voice nor touch.   Psychiatric:      Comments: Unable to assess         Fluids    Intake/Output  Summary (Last 24 hours) at 11/2/2023 1812  Last data filed at 11/2/2023 0000  Gross per 24 hour   Intake 0 ml   Output 450 ml   Net -450 ml       Laboratory  Recent Labs     10/31/23  0414 11/01/23  0400   WBC 11.3* 11.2*   RBC 3.84* 4.02*   HEMOGLOBIN 11.9* 12.3*   HEMATOCRIT 35.9* 36.9*   MCV 93.5 91.8   MCH 31.0 30.6   MCHC 33.1 33.3   RDW 47.2 46.8   PLATELETCT 103* 82*   MPV 10.9 9.9     Recent Labs     10/31/23  0414 11/01/23  0400   SODIUM 135 134*   POTASSIUM 3.6 4.1   CHLORIDE 102 102   CO2 23 23   GLUCOSE 143* 154*   BUN 17 19   CREATININE 0.54 0.53   CALCIUM 8.0* 8.8             Recent Labs     10/31/23  0414   TRIGLYCERIDE 76       Imaging  DX-CHEST-PORTABLE (1 VIEW)   Final Result         1.  Scattered hazy bilateral pulmonary infiltrates, greater on left, slightly decreased since prior study.   2.  Atherosclerosis      MR-BRAIN-W/O   Final Result         Multifocal bilateral cerebral hemispheric and cerebellar acute infarcts.      Moderate-sized confluent area of infarction noted in the right posterior frontal and anterior parietal region in the right MCA territory with minimal hemorrhage within but without mass effect or midline shift.      Confluent area of acute infarction involving the left caudate, basal ganglia and adjacent corona radiata with minimal hemorrhage within but without mass effect or midline shift.      DX-CHEST-PORTABLE (1 VIEW)   Final Result      1.  Medial left base airspace disease.   2.  2 metallic buttons project at the left chest. No other metallic foreign body seen.      EC-ECHOCARDIOGRAM COMPLETE W/ CONT   Final Result      DX-CHEST-PORTABLE (1 VIEW)   Final Result         1.  Hazy left pulmonary opacity suggests subtle infiltrates.   2.  Atherosclerosis      DX-ABDOMEN FOR TUBE PLACEMENT   Final Result      Feeding tube tip projects in the second portion of duodenum.      Nasogastric tube tip is in the distal stomach or first portion of the duodenum.      IR-THROMBO  MECHANICAL ARTERY,INIT   Final Result      1.  Occluded LEFT M1 segment. Successful mechanical thrombectomy.TICI 2b flow.   2.  Occluded RIGHT M1 M2 junction. Successful mechanical thrombectomy.TICI 2b flow      DX-CHEST-LIMITED (1 VIEW)   Final Result         Intubated. No airspace consolidation identified.      CT-CEREBRAL PERFUSION ANALYSIS   Final Result      1.  Cerebral blood flow less than 30% likely representing completed infarct = 15 mL.      2.  T Max more than 6 seconds likely representing combination of completed infarct and ischemia = 126 mL.      3.  Mismatched volume likely representing ischemic brain/penumbra = 111      4.  Please note that the cerebral perfusion was performed on the limited brain tissue around the basal ganglia region. Infarct/ischemia outside the CT perfusion sections can be missed in this study.           Assessment/Plan  * Acute ischemic stroke (HCC)- (present on admission)  Assessment & Plan  Due to thromboembolism  S/p IR thrombectomy of bilateral MCAs  Poor prognosis per Neurology, transitioned to comfort measures  Comfort Care    Comfort measures only status  Assessment & Plan  Terminal CVA from bilateral MCA embolisms  Morphine PRN pain, dyspnea  Lorazapem PRN anxiety, agitation  APAP PRN fever  Ondansetron PRN nausea  Atropine PRN secretions refractory to suction and repositioning  [facility]ospice Referral - [facility], anticipate intake 11/3      VTE (venous thromboembolism)- (present on admission)  Assessment & Plan  Hypercoagulability with multifocal thromboembolisms  Comfort Care    Metastatic adenocarcinoma (HCC)- (present on admission)  Assessment & Plan  Diagnosed by biopsy 10/24, not yet evaluated by oncology nor pulmonology  Not a candidate for antineoplastic therapy due to catastrophic CVA  Comfort Care    Acute respiratory failure with hypoxia (HCC)- (present on admission)  Assessment & Plan  Due to metastatic lung cancer and encephalopathy from thromboembolic CVAs  Comfort  Care    Aspiration pneumonia of both lower lobes due to gastric secretions (HCC)- (present on admission)  Assessment & Plan  Bilateral hazy infiltrates likely aspiration from encephalpathy and CVAs  Comfort Care         VTE prophylaxis: Comfort Care    I have performed a physical exam and reviewed and updated ROS and Plan today (11/2/2023). In review of yesterday's note (11/1/2023), there are no changes except as documented above.

## 2023-11-03 NOTE — ASSESSMENT & PLAN NOTE
Terminal CVA from bilateral MCA embolisms  Morphine PRN pain, dyspnea  Lorazapem PRN anxiety, agitation  APAP PRN fever  Ondansetron PRN nausea  Atropine PRN secretions refractory to suction and repositioning  Hospice Referral - [facility], anticipate intake 11/3

## 2023-11-03 NOTE — DISCHARGE INSTRUCTIONS
Discharge Instructions per Sha Mackey M.D.    You were hospitalized for a major stroke (cardiovascular accident) due to blood clots (thromboembolism). The stroke was determined to be terminal, for which you opted for transition to comfort measures and hospice for end-of-life care.    DIET: As able    ACTIVITY: As able    DIAGNOSIS: Bilateral MCA Thromboembolic Cardiovascular Accident    Call Hospice for any needs, questions, or concerns. Do not call 911 or present to ED unless directed by hospice agency.

## 2024-01-23 NOTE — ASSESSMENT & PLAN NOTE
Felt to be lung as primary; [facility] and + for adenocarcinoma  Further evaluation when critical illness resolves   Notified Ghada PADILLA of white drainage  upon removal of IV per RODERICK Baldwin . RN noticed a small raised area above IV site. NP came to bedside to evaluate. Okay to dc per NP.